# Patient Record
Sex: FEMALE | Race: WHITE | NOT HISPANIC OR LATINO | Employment: OTHER | URBAN - METROPOLITAN AREA
[De-identification: names, ages, dates, MRNs, and addresses within clinical notes are randomized per-mention and may not be internally consistent; named-entity substitution may affect disease eponyms.]

---

## 2017-05-31 ENCOUNTER — APPOINTMENT (OUTPATIENT)
Dept: WOMENS IMAGING | Facility: HOSPITAL | Age: 82
End: 2017-05-31

## 2017-05-31 PROCEDURE — G0202 SCR MAMMO BI INCL CAD: HCPCS | Performed by: RADIOLOGY

## 2017-06-10 ENCOUNTER — HOSPITAL ENCOUNTER (OUTPATIENT)
Facility: HOSPITAL | Age: 82
Setting detail: OBSERVATION
Discharge: HOME | End: 2017-06-13
Attending: EMERGENCY MEDICINE | Admitting: INTERNAL MEDICINE

## 2017-06-10 ENCOUNTER — APPOINTMENT (OUTPATIENT)
Dept: CT IMAGING | Facility: HOSPITAL | Age: 82
End: 2017-06-10

## 2017-06-10 ENCOUNTER — APPOINTMENT (OUTPATIENT)
Dept: GENERAL RADIOLOGY | Facility: HOSPITAL | Age: 82
End: 2017-06-10

## 2017-06-10 DIAGNOSIS — I95.1 ORTHOSTATIC HYPOTENSION: ICD-10-CM

## 2017-06-10 DIAGNOSIS — I31.39 PERICARDIAL EFFUSION: ICD-10-CM

## 2017-06-10 DIAGNOSIS — K62.5 RECTAL BLEEDING: ICD-10-CM

## 2017-06-10 DIAGNOSIS — K52.9 COLITIS: Primary | ICD-10-CM

## 2017-06-10 LAB
ABO GROUP BLD: NORMAL
ALBUMIN SERPL-MCNC: 4.3 G/DL (ref 3.5–5.2)
ALBUMIN/GLOB SERPL: 1.2 G/DL
ALP SERPL-CCNC: 58 U/L (ref 39–117)
ALT SERPL W P-5'-P-CCNC: 12 U/L (ref 1–33)
ANION GAP SERPL CALCULATED.3IONS-SCNC: 16.5 MMOL/L
ANTI-D: NORMAL
APTT PPP: 30.5 SECONDS (ref 22.7–35.4)
AST SERPL-CCNC: 17 U/L (ref 1–32)
BACTERIA UR QL AUTO: ABNORMAL /HPF
BASOPHILS # BLD AUTO: 0.02 10*3/MM3 (ref 0–0.2)
BASOPHILS NFR BLD AUTO: 0.1 % (ref 0–1.5)
BILIRUB SERPL-MCNC: 0.8 MG/DL (ref 0.1–1.2)
BILIRUB UR QL STRIP: NEGATIVE
BLD GP AB SCN SERPL QL: POSITIVE
BUN BLD-MCNC: 21 MG/DL (ref 8–23)
BUN/CREAT SERPL: 21 (ref 7–25)
CALCIUM SPEC-SCNC: 9.6 MG/DL (ref 8.6–10.5)
CHLORIDE SERPL-SCNC: 93 MMOL/L (ref 98–107)
CLARITY UR: CLEAR
CO2 SERPL-SCNC: 25.5 MMOL/L (ref 22–29)
COLOR UR: YELLOW
CREAT BLD-MCNC: 1 MG/DL (ref 0.57–1)
DEPRECATED RDW RBC AUTO: 48.9 FL (ref 37–54)
EOSINOPHIL # BLD AUTO: 0.02 10*3/MM3 (ref 0–0.7)
EOSINOPHIL NFR BLD AUTO: 0.1 % (ref 0.3–6.2)
ERYTHROCYTE [DISTWIDTH] IN BLOOD BY AUTOMATED COUNT: 14.4 % (ref 11.7–13)
GFR SERPL CREATININE-BSD FRML MDRD: 52 ML/MIN/1.73
GLOBULIN UR ELPH-MCNC: 3.5 GM/DL
GLUCOSE BLD-MCNC: 128 MG/DL (ref 65–99)
GLUCOSE UR STRIP-MCNC: NEGATIVE MG/DL
HCT VFR BLD AUTO: 39.6 % (ref 35.6–45.5)
HGB BLD-MCNC: 13.7 G/DL (ref 11.9–15.5)
HGB UR QL STRIP.AUTO: ABNORMAL
HYALINE CASTS UR QL AUTO: ABNORMAL /LPF
IMM GRANULOCYTES # BLD: 0.05 10*3/MM3 (ref 0–0.03)
IMM GRANULOCYTES NFR BLD: 0.3 % (ref 0–0.5)
INR PPP: 0.99 (ref 0.9–1.1)
INR PPP: 1.09 (ref 0.9–1.1)
KETONES UR QL STRIP: ABNORMAL
LEUKOCYTE ESTERASE UR QL STRIP.AUTO: NEGATIVE
LIPASE SERPL-CCNC: 31 U/L (ref 13–60)
LYMPHOCYTES # BLD AUTO: 2.55 10*3/MM3 (ref 0.9–4.8)
LYMPHOCYTES NFR BLD AUTO: 14.4 % (ref 19.6–45.3)
MCH RBC QN AUTO: 32.2 PG (ref 26.9–32)
MCHC RBC AUTO-ENTMCNC: 34.6 G/DL (ref 32.4–36.3)
MCV RBC AUTO: 93.2 FL (ref 80.5–98.2)
MONOCYTES # BLD AUTO: 0.53 10*3/MM3 (ref 0.2–1.2)
MONOCYTES NFR BLD AUTO: 3 % (ref 5–12)
NEUTROPHILS # BLD AUTO: 14.55 10*3/MM3 (ref 1.9–8.1)
NEUTROPHILS NFR BLD AUTO: 82.1 % (ref 42.7–76)
NITRITE UR QL STRIP: NEGATIVE
PH UR STRIP.AUTO: 6 [PH] (ref 5–8)
PLATELET # BLD AUTO: 292 10*3/MM3 (ref 140–500)
PMV BLD AUTO: 11.2 FL (ref 6–12)
POTASSIUM BLD-SCNC: 3.3 MMOL/L (ref 3.5–5.2)
PROT SERPL-MCNC: 7.8 G/DL (ref 6–8.5)
PROT UR QL STRIP: ABNORMAL
PROTHROMBIN TIME: 12.7 SECONDS (ref 11.7–14.2)
PROTHROMBIN TIME: 13.7 SECONDS (ref 11.7–14.2)
RBC # BLD AUTO: 4.25 10*6/MM3 (ref 3.9–5.2)
RBC # UR: ABNORMAL /HPF
REF LAB TEST METHOD: ABNORMAL
RH BLD: NEGATIVE
SODIUM BLD-SCNC: 135 MMOL/L (ref 136–145)
SP GR UR STRIP: >=1.03 (ref 1–1.03)
SQUAMOUS #/AREA URNS HPF: ABNORMAL /HPF
UROBILINOGEN UR QL STRIP: ABNORMAL
WBC NRBC COR # BLD: 17.72 10*3/MM3 (ref 4.5–10.7)
WBC UR QL AUTO: ABNORMAL /HPF

## 2017-06-10 PROCEDURE — 86850 RBC ANTIBODY SCREEN: CPT

## 2017-06-10 PROCEDURE — 85025 COMPLETE CBC W/AUTO DIFF WBC: CPT | Performed by: EMERGENCY MEDICINE

## 2017-06-10 PROCEDURE — G0378 HOSPITAL OBSERVATION PER HR: HCPCS

## 2017-06-10 PROCEDURE — 96360 HYDRATION IV INFUSION INIT: CPT

## 2017-06-10 PROCEDURE — 86922 COMPATIBILITY TEST ANTIGLOB: CPT

## 2017-06-10 PROCEDURE — 71020 HC CHEST PA AND LATERAL: CPT

## 2017-06-10 PROCEDURE — 87046 STOOL CULTR AEROBIC BACT EA: CPT | Performed by: INTERNAL MEDICINE

## 2017-06-10 PROCEDURE — 85610 PROTHROMBIN TIME: CPT | Performed by: INTERNAL MEDICINE

## 2017-06-10 PROCEDURE — 96361 HYDRATE IV INFUSION ADD-ON: CPT

## 2017-06-10 PROCEDURE — 86901 BLOOD TYPING SEROLOGIC RH(D): CPT | Performed by: EMERGENCY MEDICINE

## 2017-06-10 PROCEDURE — 25010000002 LEVOFLOXACIN PER 250 MG: Performed by: EMERGENCY MEDICINE

## 2017-06-10 PROCEDURE — 86870 RBC ANTIBODY IDENTIFICATION: CPT | Performed by: EMERGENCY MEDICINE

## 2017-06-10 PROCEDURE — 85014 HEMATOCRIT: CPT | Performed by: INTERNAL MEDICINE

## 2017-06-10 PROCEDURE — 87045 FECES CULTURE AEROBIC BACT: CPT | Performed by: INTERNAL MEDICINE

## 2017-06-10 PROCEDURE — 93005 ELECTROCARDIOGRAM TRACING: CPT | Performed by: INTERNAL MEDICINE

## 2017-06-10 PROCEDURE — 93010 ELECTROCARDIOGRAM REPORT: CPT | Performed by: INTERNAL MEDICINE

## 2017-06-10 PROCEDURE — 85018 HEMOGLOBIN: CPT | Performed by: INTERNAL MEDICINE

## 2017-06-10 PROCEDURE — 85610 PROTHROMBIN TIME: CPT | Performed by: EMERGENCY MEDICINE

## 2017-06-10 PROCEDURE — 85730 THROMBOPLASTIN TIME PARTIAL: CPT | Performed by: INTERNAL MEDICINE

## 2017-06-10 PROCEDURE — 83690 ASSAY OF LIPASE: CPT | Performed by: EMERGENCY MEDICINE

## 2017-06-10 PROCEDURE — 81001 URINALYSIS AUTO W/SCOPE: CPT | Performed by: INTERNAL MEDICINE

## 2017-06-10 PROCEDURE — 0 IOPAMIDOL 61 % SOLUTION: Performed by: EMERGENCY MEDICINE

## 2017-06-10 PROCEDURE — 86900 BLOOD TYPING SEROLOGIC ABO: CPT | Performed by: EMERGENCY MEDICINE

## 2017-06-10 PROCEDURE — 86920 COMPATIBILITY TEST SPIN: CPT

## 2017-06-10 PROCEDURE — 74177 CT ABD & PELVIS W/CONTRAST: CPT

## 2017-06-10 PROCEDURE — 86850 RBC ANTIBODY SCREEN: CPT | Performed by: EMERGENCY MEDICINE

## 2017-06-10 PROCEDURE — 99284 EMERGENCY DEPT VISIT MOD MDM: CPT

## 2017-06-10 PROCEDURE — 80053 COMPREHEN METABOLIC PANEL: CPT | Performed by: EMERGENCY MEDICINE

## 2017-06-10 RX ORDER — LEVOFLOXACIN 5 MG/ML
500 INJECTION, SOLUTION INTRAVENOUS ONCE
Status: COMPLETED | OUTPATIENT
Start: 2017-06-10 | End: 2017-06-10

## 2017-06-10 RX ORDER — POTASSIUM CHLORIDE 1.5 G/1.77G
40 POWDER, FOR SOLUTION ORAL AS NEEDED
Status: DISCONTINUED | OUTPATIENT
Start: 2017-06-10 | End: 2017-06-13 | Stop reason: HOSPADM

## 2017-06-10 RX ORDER — AMLODIPINE BESYLATE 5 MG/1
5 TABLET ORAL NIGHTLY
Status: DISCONTINUED | OUTPATIENT
Start: 2017-06-10 | End: 2017-06-13 | Stop reason: HOSPADM

## 2017-06-10 RX ORDER — PANTOPRAZOLE SODIUM 40 MG/1
40 TABLET, DELAYED RELEASE ORAL
Status: DISCONTINUED | OUTPATIENT
Start: 2017-06-11 | End: 2017-06-13 | Stop reason: HOSPADM

## 2017-06-10 RX ORDER — AMLODIPINE BESYLATE 5 MG/1
5 TABLET ORAL DAILY
Status: DISCONTINUED | OUTPATIENT
Start: 2017-06-11 | End: 2017-06-10

## 2017-06-10 RX ORDER — VIT A/VIT C/VIT E/ZINC/COPPER 4296-226
1 CAPSULE ORAL DAILY
Status: DISCONTINUED | OUTPATIENT
Start: 2017-06-11 | End: 2017-06-13 | Stop reason: HOSPADM

## 2017-06-10 RX ORDER — LISINOPRIL 40 MG/1
40 TABLET ORAL NIGHTLY
Status: DISCONTINUED | OUTPATIENT
Start: 2017-06-10 | End: 2017-06-13 | Stop reason: HOSPADM

## 2017-06-10 RX ORDER — MELATONIN
1000 DAILY
Status: DISCONTINUED | OUTPATIENT
Start: 2017-06-11 | End: 2017-06-13 | Stop reason: HOSPADM

## 2017-06-10 RX ORDER — POTASSIUM CHLORIDE 750 MG/1
40 CAPSULE, EXTENDED RELEASE ORAL AS NEEDED
Status: DISCONTINUED | OUTPATIENT
Start: 2017-06-10 | End: 2017-06-13 | Stop reason: HOSPADM

## 2017-06-10 RX ORDER — DEXTROSE, SODIUM CHLORIDE, AND POTASSIUM CHLORIDE 5; .45; .15 G/100ML; G/100ML; G/100ML
125 INJECTION INTRAVENOUS CONTINUOUS
Status: DISCONTINUED | OUTPATIENT
Start: 2017-06-10 | End: 2017-06-11

## 2017-06-10 RX ORDER — METOPROLOL TARTRATE 100 MG/1
100 TABLET ORAL DAILY
Status: DISCONTINUED | OUTPATIENT
Start: 2017-06-11 | End: 2017-06-10

## 2017-06-10 RX ORDER — ONDANSETRON 2 MG/ML
4 INJECTION INTRAMUSCULAR; INTRAVENOUS EVERY 6 HOURS PRN
Status: DISCONTINUED | OUTPATIENT
Start: 2017-06-10 | End: 2017-06-13 | Stop reason: HOSPADM

## 2017-06-10 RX ORDER — SODIUM CHLORIDE 0.9 % (FLUSH) 0.9 %
10 SYRINGE (ML) INJECTION AS NEEDED
Status: DISCONTINUED | OUTPATIENT
Start: 2017-06-10 | End: 2017-06-10

## 2017-06-10 RX ORDER — LISINOPRIL 40 MG/1
40 TABLET ORAL DAILY
Status: DISCONTINUED | OUTPATIENT
Start: 2017-06-11 | End: 2017-06-10

## 2017-06-10 RX ORDER — LEVOFLOXACIN 5 MG/ML
500 INJECTION, SOLUTION INTRAVENOUS ONCE
Status: DISCONTINUED | OUTPATIENT
Start: 2017-06-10 | End: 2017-06-10

## 2017-06-10 RX ORDER — METOPROLOL TARTRATE 100 MG/1
100 TABLET ORAL NIGHTLY
Status: DISCONTINUED | OUTPATIENT
Start: 2017-06-10 | End: 2017-06-13 | Stop reason: HOSPADM

## 2017-06-10 RX ORDER — SODIUM CHLORIDE 0.9 % (FLUSH) 0.9 %
1-10 SYRINGE (ML) INJECTION AS NEEDED
Status: DISCONTINUED | OUTPATIENT
Start: 2017-06-10 | End: 2017-06-13 | Stop reason: HOSPADM

## 2017-06-10 RX ADMIN — METOPROLOL TARTRATE 100 MG: 100 TABLET ORAL at 20:59

## 2017-06-10 RX ADMIN — AMLODIPINE BESYLATE 5 MG: 5 TABLET ORAL at 20:59

## 2017-06-10 RX ADMIN — POTASSIUM CHLORIDE, DEXTROSE MONOHYDRATE AND SODIUM CHLORIDE 125 ML/HR: 150; 5; 450 INJECTION, SOLUTION INTRAVENOUS at 18:35

## 2017-06-10 RX ADMIN — SODIUM CHLORIDE 1000 ML: 9 INJECTION, SOLUTION INTRAVENOUS at 09:41

## 2017-06-10 RX ADMIN — IOPAMIDOL 85 ML: 612 INJECTION, SOLUTION INTRAVENOUS at 10:41

## 2017-06-10 RX ADMIN — LEVOFLOXACIN 500 MG: 5 INJECTION, SOLUTION INTRAVENOUS at 11:53

## 2017-06-10 RX ADMIN — POTASSIUM CHLORIDE, DEXTROSE MONOHYDRATE AND SODIUM CHLORIDE 125 ML/HR: 150; 5; 450 INJECTION, SOLUTION INTRAVENOUS at 18:37

## 2017-06-10 RX ADMIN — LISINOPRIL 40 MG: 40 TABLET ORAL at 20:59

## 2017-06-10 RX ADMIN — METRONIDAZOLE 500 MG: 500 INJECTION, SOLUTION INTRAVENOUS at 16:23

## 2017-06-10 RX ADMIN — CALCIUM CARBONATE-VITAMIN D TAB 500 MG-200 UNIT 500 MG: 500-200 TAB at 18:35

## 2017-06-10 NOTE — PLAN OF CARE
Problem: Patient Care Overview (Adult)  Goal: Plan of Care Review  Outcome: Ongoing (interventions implemented as appropriate)    06/10/17 1339   Coping/Psychosocial Response Interventions   Plan Of Care Reviewed With patient   Patient Care Overview   Progress no change   Outcome Evaluation   Outcome Summary/Follow up Plan new admission. bloody stools at home, usually independent, new orthostatic hypotn. oriented to room and unit, pt educated to call for asst.       Goal: Adult Individualization and Mutuality  Outcome: Ongoing (interventions implemented as appropriate)  Goal: Discharge Needs Assessment  Outcome: Ongoing (interventions implemented as appropriate)    Problem: Diarrhea (Adult)  Goal: Identify Related Risk Factors and Signs and Symptoms  Outcome: Outcome(s) achieved Date Met:  06/10/17  Goal: Improved/Reduced Symptoms  Outcome: Ongoing (interventions implemented as appropriate)

## 2017-06-10 NOTE — H&P
CHIEF COMPLAINT:  Lower abdominal cramps; blood per rectum.     HISTORY OF PRESENT ILLNESS:  Patient is a very nice 89-year-old lady. She has a history of hypertension, macular degeneration, and osteoporosis. The patient states she was doing well, until yesterday after lunch. She ate a tuna melt at a restaurant. Her sister ate one-half of her sandwich. Driving home, the patient started feeling bad; she started having some nausea and lower abdominal cramps. By the time she got home, the discomfort was significant, she vomited x2, and had a few loose bowel movements. Thereafter for the rest of the day, she had lower abdominal crampy pain. She had nausea but no more vomiting. She started to develop diarrhea; at first her stools were light in color, but by 3 or 4 in the morning she was having bright-red blood per rectum. She states she had a significant amount of blood per rectum. She still had some at times severe crampy abdominal discomfort. She states she felt hot and dizzy at the onset of these symptoms (after eating the tuna melt). She had no other fevers, chills, or sweats. She had a little bit of dizziness this morning. After lunch yesterday, she drank a little bit of fluid and took her medicines, but otherwise did not eat or drink anything. The patient has not had any significant travel. She came back from [place], [place], this spring. She has not been around anybody that has been ill. She has had no past GI problems, although she states she has constipation from time-to-time and was told she had diverticula on a distant colonoscopy.     ALLERGIES:  No known drug allergies.      CURRENT MEDICATIONS:  1.  Amlodipine 5 mg a day.  2.  Lisinopril 40 mg daily.   3.  Toprol- mg a day.   4.  Vitamin D 2000 international units daily.   5.  Diazepam 10 mg at night as needed; she takes this maybe a couple times a month. 6  6.  PreserVision b.i.d.   7.  Calcium plus D b.i.d.     PAST MEDICAL HISTORY:  1.  Essential  hypertension.   2.  Possible primary hyperparathyroidism; this was based on a nodule being detected in her parathyroid gland. I am not sure if her PTH or calciums were ever elevated.   3.  Vitamin D deficiency.  4.  Chronic anemia.  5.  Osteoporosis.   6.  Macular degeneration.   7.  The patient has had polymyalgia rheumatica several years ago.   8.  She has a history of restless leg syndrome.   9.  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   10.  Patient is up-to-date on influenza and Pneumovax. She has not had Tdap, Zostavax, and I am not sure if she had Prevnar 13. She does not have a gastroenterologist.     PAST SURGICAL HISTORY:  1.  Appendectomy age 11.   2.  Patient's last colonoscopy was in ; apparently demonstrated some diverticula.     FAMILY HISTORY:  Father's history is unknown to the patient; he is obviously . Mother  at age 80; she had bladder cancer and asthma. The patient has an 87-year-old sister who had Mycobacterium avium intracellulare infection; she has osteoporosis and hypertension. She has a 63-year-old daughter who has had breast cancer and has colon polyps. She has 2 grandchildren in good health.     SOCIAL HISTORY:  The patient quit smoking 25 years ago; she had a 30 pack-year history of abuse. She drinks usually 2 mixed drinks or a glass of wine 3 or 4 days a week. She is ; she lives with her . She exercises at [facility], doing low-impact aerobics. She also enjoys playing bridge.     REVIEW OF SYSTEMS:  GENERAL: No fevers, chills, or night sweats, other than what is mentioned above. The patient's weight has been stable. She has been having fatigue.   NERVOUS SYSTEM: Unremarkable.   GASTROENTEROLOGIC: As mentioned above.   CARDIORESPIRATORY: No chest pain or shortness breath. No leg edema.   GENITOURINARY: Unremarkable.   MUSCULOSKELETAL: Unremarkable.   DERMATOLOGIC: Unremarkable.   PSYCHIATRIC: No  depression or anxiety.     PHYSICAL EXAMINATION:  VITAL SIGNS: Blood pressure 165/75, pulse 63, respiratory rate 20, temperature 98.5.   APPEARANCE: The patient is an elderly, thin,  lady who is in no acute distress. She is sitting up in bed, talking.   HEENT: Normocephalic, atraumatic. Pupils round, equal, and reactive to light and accommodation. Bilateral arcus. No sinus tenderness. Ears normal externally. Pharynx clear. Mucous membranes are dry.   NECK: Slightly decreased range of motion. No lymphadenopathy, JVD, or thyromegaly.   LUNGS: Are clear to auscultation and percussion.   HEART: Regular rate and rhythm. No murmur, gallop, or rub.   PERIPHERAL VASCULAR: Good dorsal, pedal, and posterior tibial pulses.   ABDOMEN: No significant fat pad. Appendectomy scar right lower quadrant. The patient has normal bowel sounds. She has mild tenderness palpating her pelvic region, but no rebound or guarding.   EXTREMITIES: Without clubbing, cyanosis, edema. She has a few varicosities below the knees.   JOINTS: OA changes hands and feet.   DERMATOLOGIC: Unremarkable.   NEUROLOGIC: Is nonfocal.     DIAGNOSTIC DATA:  CMP was significant for glucose 128, sodium 135, potassium 3.3, creatinine 1.0, BUN 21, estimated GFR 52. LFTs were normal. Lipase 31. INR was 0.99. White count 17, hemoglobin 13, hematocrit 39, platelet count was 269,000, MCV was 93. A CT of the abdomen and pelvis demonstrated evidence of nonspecific colitis distal colon; no obstructive uropathy; multiple very small hepatic cysts; mild to moderate pericardial effusion; very small pulmonary nodules. Three month followup was suggested for this.    IMPRESSION:  1.  Lower GI hemorrhage: Possible etiologies colitis, diverticular disease, ischemia.   2.  History of essential hypertension.   3.  Osteoporosis.   4.  Macular degeneration.   5.  Multiple very small pulmonary nodules, etiology unknown.     PLAN:  The patient has been admitted to the hospital. She  has been placed on clear liquids. I have started IV fluids. I am replacing her potassium. I have started on both Levaquin and Flagyl. We are obtaining stool studies. We will watch her hemoglobin and hematocrit closely. She has been typed-and-screened for a couple units. She will be seen by Gastroenterology. It should be noted this patient is NO CODE.        BIMAL Carlson:ms  D:   06/10/2017 16:41:28  T:   06/10/2017 17:22:12  Job ID:   [id number]  Document ID:   [id number]  cc:

## 2017-06-10 NOTE — ED NOTES
Orthostatic Vitals    Lying BP       162/80         HR      57        @ 0937  Standing X1 BP     85/42       HR     73        @ 0942  Standing (modified to sitting) X2 BP    160/84        HR      62       @ 0944     Cristina Robertson  06/10/17 0944       Cristina Robertson  06/10/17 0947

## 2017-06-10 NOTE — ED PROVIDER NOTES
EMERGENCY DEPARTMENT ENCOUNTER    CHIEF COMPLAINT  Chief Complaint: rectal bleeding  History given by: patient, family  History limited by: nothing  Room Number: 28/28  PMD: Harley Barney MD      HPI:  Pt is a 89 y.o. female who presents complaining of rectal bleeding, which began last night. Pt state that she developed generalized abd pain and N/V/D yesterday after eating lunch. Pt states that she then began to pass bright red blood per rectum during the night. Pt states that she continues to have mild abd cramping, which improved after having a BM. Pt also complains of chronic, unchanged light headedness and generalized weakness but denies fever, chills, SOB. Pt states that she does have a history of diverticulosis but denies recent abx use.    Duration:  One day  Onset: gradual  Timing: intermittent  Location: rectal  Radiation: N/A  Quality: bright red blood  Intensity/Severity: moderate  Progression: unchanged  Associated Symptoms: abd pain, N/V/D, chronic light headedness, generalized weakness  Aggravating Factors: none  Alleviating Factors: none  Previous Episodes: Pt denies having similar symptoms previously.  Treatment before arrival: none    PAST MEDICAL HISTORY  Active Ambulatory Problems     Diagnosis Date Noted   • Osteoporosis 05/03/2016     Resolved Ambulatory Problems     Diagnosis Date Noted   • No Resolved Ambulatory Problems     Past Medical History:   Diagnosis Date   • Anemia    • Diverticulosis    • History of irregular heartbeat    • Hypertension    • Macular degeneration    • Osteoporosis    • Osteoporosis    • PNA (pneumonia)    • Urinary tract infection        PAST SURGICAL HISTORY  Past Surgical History:   Procedure Laterality Date   • APPENDECTOMY     • SKIN CANCER EXCISION         FAMILY HISTORY  History reviewed. No pertinent family history.    SOCIAL HISTORY  Social History     Social History   • Marital status: Single     Spouse name: N/A   • Number of children: N/A   • Years of  education: N/A     Occupational History   • Not on file.     Social History Main Topics   • Smoking status: Former Smoker   • Smokeless tobacco: Not on file      Comment: quit 25 years ago   • Alcohol use 1.2 oz/week     2 Glasses of wine per week      Comment: social   • Drug use: No   • Sexual activity: Defer     Other Topics Concern   • Not on file     Social History Narrative   • No narrative on file       ALLERGIES  Review of patient's allergies indicates no known allergies.    REVIEW OF SYSTEMS  Review of Systems   Constitutional: Negative for fever.   HENT: Negative for sore throat.    Eyes: Negative.    Respiratory: Negative for cough and shortness of breath.    Cardiovascular: Negative for chest pain.   Gastrointestinal: Positive for abdominal pain, anal bleeding, diarrhea, nausea and vomiting.   Genitourinary: Negative for dysuria.   Musculoskeletal: Negative for neck pain.   Skin: Negative for rash.   Allergic/Immunologic: Negative.    Neurological: Positive for weakness (generalized) and light-headedness (chronic, unchanged). Negative for numbness and headaches.   Hematological: Negative.    Psychiatric/Behavioral: Negative.    All other systems reviewed and are negative.      PHYSICAL EXAM  ED Triage Vitals   Temp Heart Rate Resp BP SpO2   06/10/17 0904 06/10/17 0904 06/10/17 0904 06/10/17 0907 06/10/17 0904   99.2 °F (37.3 °C) 72 16 149/87 97 %      Temp src Heart Rate Source Patient Position BP Location FiO2 (%)   06/10/17 0904 -- 06/10/17 0907 06/10/17 0907 --   Tympanic  Sitting Left arm        Physical Exam   Constitutional: She is oriented to person, place, and time. She appears distressed (mild).   HENT:   Head: Normocephalic and atraumatic.   Eyes: Conjunctivae and EOM are normal. Pupils are equal, round, and reactive to light.   Neck: Normal range of motion. Neck supple.   Cardiovascular: Normal rate, regular rhythm and normal heart sounds.    No murmur heard.  Pulmonary/Chest: Effort normal  and breath sounds normal. No respiratory distress.   Abdominal: Soft. Bowel sounds are normal. There is no tenderness. There is no rebound and no guarding.   Genitourinary: Rectal exam shows guaiac positive stool.   Genitourinary Comments: Female chaperone present for  exam. Bright red blood with brown stool in the rectal vault.   Musculoskeletal: Normal range of motion. She exhibits no edema.   Left foot is in a walking boot   Neurological: She is alert and oriented to person, place, and time. She has normal sensation and normal strength.   Skin: Skin is warm and dry. No rash noted.   Psychiatric: Mood and affect normal.   Nursing note and vitals reviewed.      LAB RESULTS  Lab Results (last 24 hours)     Procedure Component Value Units Date/Time    CBC & Differential [[id number]] Collected:  06/10/17 0938    Specimen:  Blood Updated:  06/10/17 1015    Narrative:       The following orders were created for panel order CBC & Differential.  Procedure                               Abnormality         Status                     ---------                               -----------         ------                     Scan Slide[[id number]]                                                                  CBC Auto Differential[[id number]]         Abnormal            Final result                 Please view results for these tests on the individual orders.    Comprehensive Metabolic Panel [[id number]]  (Abnormal) Collected:  06/10/17 0938    Specimen:  Blood Updated:  06/10/17 1008     Glucose 128 (H) mg/dL      BUN 21 mg/dL      Creatinine 1.00 mg/dL      Sodium 135 (L) mmol/L      Potassium 3.3 (L) mmol/L      Chloride 93 (L) mmol/L      CO2 25.5 mmol/L      Calcium 9.6 mg/dL      Total Protein 7.8 g/dL      Albumin 4.30 g/dL      ALT (SGPT) 12 U/L      AST (SGOT) 17 U/L      Alkaline Phosphatase 58 U/L      Total Bilirubin 0.8 mg/dL      eGFR Non African Amer 52 (L) mL/min/1.73      Globulin 3.5 gm/dL      A/G Ratio 1.2 g/dL       BUN/Creatinine Ratio 21.0     Anion Gap 16.5 mmol/L     Narrative:       The MDRD GFR formula is only valid for adults with stable renal function between ages 18 and 70.    Lipase [[id number]]  (Normal) Collected:  06/10/17 0938    Specimen:  Blood Updated:  06/10/17 1008     Lipase 31 U/L     Protime-INR [[id number]]  (Normal) Collected:  06/10/17 0938    Specimen:  Blood Updated:  06/10/17 1003     Protime 12.7 Seconds      INR 0.99    CBC Auto Differential [[id number]]  (Abnormal) Collected:  06/10/17 0938    Specimen:  Blood Updated:  06/10/17 1015     WBC 17.72 (H) 10*3/mm3      RBC 4.25 10*6/mm3      Hemoglobin 13.7 g/dL      Hematocrit 39.6 %      MCV 93.2 fL      MCH 32.2 (H) pg      MCHC 34.6 g/dL      RDW 14.4 (H) %      RDW-SD 48.9 fl      MPV 11.2 fL      Platelets 292 10*3/mm3      Neutrophil % 82.1 (H) %      Lymphocyte % 14.4 (L) %      Monocyte % 3.0 (L) %      Eosinophil % 0.1 (L) %      Basophil % 0.1 %      Immature Grans % 0.3 %      Neutrophils, Absolute 14.55 (H) 10*3/mm3      Lymphocytes, Absolute 2.55 10*3/mm3      Monocytes, Absolute 0.53 10*3/mm3      Eosinophils, Absolute 0.02 10*3/mm3      Basophils, Absolute 0.02 10*3/mm3      Immature Grans, Absolute 0.05 (H) 10*3/mm3           I ordered the above labs and reviewed the results    RADIOLOGY  CT Abdomen Pelvis With Contrast   Final Result       1. Evidence of nonspecific colitis of the distal colon. Mild free fluid   in the pelvis.  Colonic diverticulosis.   2. No obstructive uropathy. Multiple small hepatic cysts and low   densities too small to characterize, small right renal low density too   small to characterize.   3. Mild to moderate pericardial effusion, only partly included.   4. Multiple small pulmonary nodules, three-month follow-up chest CT is   advised to characterize change.       Discussed by telephone with Dr. Landers at time of interpretation, 1127 on   06/10/2017.       This report was finalized on 6/10/2017 11:30 AM by   Patricio Stevenson MD.             1128- Reviewed pt's CT Abd/Pelvis, which shows distal colitis, no diverticulitis and a small pericardial effusion. Independently viewed by me. Interpreted by radiologist. Discussed with Dr. Stevenson.    I ordered the above noted radiological studies. Interpreted by radiologist. Discussed with radiologist (Dr. Stevenson). Reviewed by me in PACS.       PROCEDURES  Procedures      PROGRESS AND CONSULTS  ED Course     0928- Ordered blood work, lipase, PT with INR, orthostatic BPs and CT Abd/Pelvis for further evaluation. Ordered IVF for hydration.    1008- Per ER tech, the pt is orthostatic.     1139- Ordered levaquin and flagyl for colitis.     11:40 AM  Latest vital signs   BP- 174/83 HR- 63 Temp- 99.2 °F (37.3 °C) (Tympanic) O2 sat- 98%    1150- Rechecked pt. Pt is resting comfortably. Notified pt and family of the pt's lab and imaging results, including the pt's elevated WBC count, normal hemoglobin and colitis seen on CT Abd/Pelvis. Discussed the plan to admit the pt for IV abx and further evaluation. Pt and family agree with the plan and all questions were addressed.    1153- Placed call to Dr. Barney (PMD) for admission.    1156- Discussed the pt's case with Dr. Barney (PMD) who agrees to admit the pt to a telemetry bed.    MEDICAL DECISION MAKING  Results were reviewed/discussed with the patient and they were also made aware of online access. Pt also made aware that some labs, such as cultures, will not be resulted during ER visit and follow up with PMD is necessary.     MDM  Number of Diagnoses or Management Options  Colitis:   Orthostatic hypotension:   Pericardial effusion, small:   Rectal bleeding:      Amount and/or Complexity of Data Reviewed  Clinical lab tests: ordered and reviewed (WBC=17.72)  Tests in the radiology section of CPT®: ordered and reviewed (CT Abd/Pelvis shows distal colitis, no diverticulitis and a small pericardial effusion.)  Discussion of test results  with the performing providers: yes (D/w Dr. Stevenson)  Obtain history from someone other than the patient: yes (spouse)  Discuss the patient with other providers: yes (D/w Dr. Barney (PMD))  Independent visualization of images, tracings, or specimens: yes           DIAGNOSIS  Final diagnoses:   Colitis   Rectal bleeding   Orthostatic hypotension   Pericardial effusion, small       DISPOSITION  ADMISSION    Discussed treatment plan and reason for admission with pt/family and admitting physician.  Pt/family voiced understanding of the plan for admission for further testing/treatment as needed.     Latest Documented Vital Signs:  As of 12:03 PM  BP- 174/83 HR- 63 Temp- 99.2 °F (37.3 °C) (Tympanic) O2 sat- 98%    --  Documentation assistance provided by salud Santiago for Dr. Landers.  Information recorded by the scribe was done at my direction and has been verified and validated by me.     Lexy Santiago  06/10/17 6324       Marko Landers MD  06/10/17 4536

## 2017-06-11 LAB
ANION GAP SERPL CALCULATED.3IONS-SCNC: 11.8 MMOL/L
BASOPHILS # BLD AUTO: 0.02 10*3/MM3 (ref 0–0.2)
BASOPHILS NFR BLD AUTO: 0.1 % (ref 0–1.5)
BUN BLD-MCNC: 11 MG/DL (ref 8–23)
BUN/CREAT SERPL: 18.6 (ref 7–25)
CALCIUM SPEC-SCNC: 8.3 MG/DL (ref 8.6–10.5)
CHLORIDE SERPL-SCNC: 99 MMOL/L (ref 98–107)
CO2 SERPL-SCNC: 24.2 MMOL/L (ref 22–29)
CREAT BLD-MCNC: 0.59 MG/DL (ref 0.57–1)
DEPRECATED RDW RBC AUTO: 50.1 FL (ref 37–54)
EOSINOPHIL # BLD AUTO: 0.06 10*3/MM3 (ref 0–0.7)
EOSINOPHIL NFR BLD AUTO: 0.4 % (ref 0.3–6.2)
ERYTHROCYTE [DISTWIDTH] IN BLOOD BY AUTOMATED COUNT: 14.7 % (ref 11.7–13)
ERYTHROCYTE [SEDIMENTATION RATE] IN BLOOD: 20 MM/HR (ref 0–30)
GFR SERPL CREATININE-BSD FRML MDRD: 96 ML/MIN/1.73
GLUCOSE BLD-MCNC: 133 MG/DL (ref 65–99)
HCT VFR BLD AUTO: 33.3 % (ref 35.6–45.5)
HCT VFR BLD AUTO: 33.8 % (ref 35.6–45.5)
HCT VFR BLD AUTO: 34 % (ref 35.6–45.5)
HCT VFR BLD AUTO: 34.7 % (ref 35.6–45.5)
HGB BLD-MCNC: 11.4 G/DL (ref 11.9–15.5)
HGB BLD-MCNC: 11.4 G/DL (ref 11.9–15.5)
HGB BLD-MCNC: 11.5 G/DL (ref 11.9–15.5)
HGB BLD-MCNC: 12.1 G/DL (ref 11.9–15.5)
IMM GRANULOCYTES # BLD: 0.05 10*3/MM3 (ref 0–0.03)
IMM GRANULOCYTES NFR BLD: 0.3 % (ref 0–0.5)
LYMPHOCYTES # BLD AUTO: 1.64 10*3/MM3 (ref 0.9–4.8)
LYMPHOCYTES NFR BLD AUTO: 10.2 % (ref 19.6–45.3)
MAGNESIUM SERPL-MCNC: 1.6 MG/DL (ref 1.6–2.4)
MCH RBC QN AUTO: 32.3 PG (ref 26.9–32)
MCHC RBC AUTO-ENTMCNC: 34.2 G/DL (ref 32.4–36.3)
MCV RBC AUTO: 94.3 FL (ref 80.5–98.2)
MONOCYTES # BLD AUTO: 1.69 10*3/MM3 (ref 0.2–1.2)
MONOCYTES NFR BLD AUTO: 10.5 % (ref 5–12)
NEUTROPHILS # BLD AUTO: 12.58 10*3/MM3 (ref 1.9–8.1)
NEUTROPHILS NFR BLD AUTO: 78.5 % (ref 42.7–76)
PLATELET # BLD AUTO: 238 10*3/MM3 (ref 140–500)
PMV BLD AUTO: 11 FL (ref 6–12)
POTASSIUM BLD-SCNC: 3.4 MMOL/L (ref 3.5–5.2)
RBC # BLD AUTO: 3.53 10*6/MM3 (ref 3.9–5.2)
SODIUM BLD-SCNC: 135 MMOL/L (ref 136–145)
TSH SERPL DL<=0.05 MIU/L-ACNC: 1.59 MIU/ML (ref 0.27–4.2)
WBC NRBC COR # BLD: 16.04 10*3/MM3 (ref 4.5–10.7)

## 2017-06-11 PROCEDURE — 85652 RBC SED RATE AUTOMATED: CPT | Performed by: INTERNAL MEDICINE

## 2017-06-11 PROCEDURE — G0378 HOSPITAL OBSERVATION PER HR: HCPCS

## 2017-06-11 PROCEDURE — 96361 HYDRATE IV INFUSION ADD-ON: CPT

## 2017-06-11 PROCEDURE — 83735 ASSAY OF MAGNESIUM: CPT | Performed by: INTERNAL MEDICINE

## 2017-06-11 PROCEDURE — 86900 BLOOD TYPING SEROLOGIC ABO: CPT

## 2017-06-11 PROCEDURE — 86901 BLOOD TYPING SEROLOGIC RH(D): CPT

## 2017-06-11 PROCEDURE — 80048 BASIC METABOLIC PNL TOTAL CA: CPT | Performed by: INTERNAL MEDICINE

## 2017-06-11 PROCEDURE — 85014 HEMATOCRIT: CPT | Performed by: INTERNAL MEDICINE

## 2017-06-11 PROCEDURE — 85025 COMPLETE CBC W/AUTO DIFF WBC: CPT | Performed by: INTERNAL MEDICINE

## 2017-06-11 PROCEDURE — 99204 OFFICE O/P NEW MOD 45 MIN: CPT | Performed by: INTERNAL MEDICINE

## 2017-06-11 PROCEDURE — 84443 ASSAY THYROID STIM HORMONE: CPT | Performed by: INTERNAL MEDICINE

## 2017-06-11 PROCEDURE — 85018 HEMOGLOBIN: CPT | Performed by: INTERNAL MEDICINE

## 2017-06-11 RX ORDER — DEXTROSE AND SODIUM CHLORIDE 5; .45 G/100ML; G/100ML
75 INJECTION, SOLUTION INTRAVENOUS CONTINUOUS
Status: DISCONTINUED | OUTPATIENT
Start: 2017-06-11 | End: 2017-06-12

## 2017-06-11 RX ADMIN — POTASSIUM CHLORIDE, DEXTROSE MONOHYDRATE AND SODIUM CHLORIDE 125 ML/HR: 150; 5; 450 INJECTION, SOLUTION INTRAVENOUS at 02:40

## 2017-06-11 RX ADMIN — PANTOPRAZOLE SODIUM 40 MG: 40 TABLET, DELAYED RELEASE ORAL at 05:50

## 2017-06-11 RX ADMIN — Medication 1 TABLET: at 09:36

## 2017-06-11 RX ADMIN — CALCIUM CARBONATE-VITAMIN D TAB 500 MG-200 UNIT 500 MG: 500-200 TAB at 08:11

## 2017-06-11 RX ADMIN — DEXTROSE AND SODIUM CHLORIDE 75 ML/HR: 5; 450 INJECTION, SOLUTION INTRAVENOUS at 11:10

## 2017-06-11 RX ADMIN — LISINOPRIL 40 MG: 40 TABLET ORAL at 20:35

## 2017-06-11 RX ADMIN — AMLODIPINE BESYLATE 5 MG: 5 TABLET ORAL at 20:34

## 2017-06-11 RX ADMIN — METOPROLOL TARTRATE 100 MG: 100 TABLET ORAL at 20:35

## 2017-06-11 RX ADMIN — VITAMIN D, TAB 1000IU (100/BT) 1000 UNITS: 25 TAB at 08:11

## 2017-06-11 RX ADMIN — POTASSIUM CHLORIDE 40 MEQ: 750 CAPSULE, EXTENDED RELEASE ORAL at 17:19

## 2017-06-11 RX ADMIN — CALCIUM CARBONATE-VITAMIN D TAB 500 MG-200 UNIT 500 MG: 500-200 TAB at 17:19

## 2017-06-11 NOTE — PROGRESS NOTES
"History:    This is an 89-year-old lady who was admitted yesterday with bloody diarrhea.  She had an elevated white blood cell count and her CT of the abdomen and pelvis was consistent with colitis.    The patient's been on clear liquids.  She's receiving both Levaquin and Flagyl.  She's had no further bowel movements.  No fever or chills.  No significant abdominal discomfort.    Allergies  Review of patient's allergies indicates no known allergies.      Current Facility-Administered Medications:   •  amLODIPine (NORVASC) tablet 5 mg, 5 mg, Oral, Nightly, Harley Barney MD, 5 mg at 06/10/17 2059  •  calcium-vitamin D 500-200 MG-UNIT tablet 500 mg, 500 mg, Oral, BID, Harley Barney MD, 500 mg at 06/11/17 0811  •  cholecalciferol (VITAMIN D3) tablet 1,000 Units, 1,000 Units, Oral, Daily, Harley Barney MD, 1,000 Units at 06/11/17 0811  •  dextrose 5 % and sodium chloride 0.45 % with KCl 20 mEq/L infusion, 125 mL/hr, Intravenous, Continuous, Harley Barney MD, Last Rate: 125 mL/hr at 06/11/17 0240, 125 mL/hr at 06/11/17 0240  •  lisinopril (PRINIVIL,ZESTRIL) tablet 40 mg, 40 mg, Oral, Nightly, Harley Barney MD, 40 mg at 06/10/17 2059  •  metoprolol tartrate (LOPRESSOR) tablet 100 mg, 100 mg, Oral, Nightly, Harley Barney MD, 100 mg at 06/10/17 2059  •  ondansetron (ZOFRAN) injection 4 mg, 4 mg, Intravenous, Q6H PRN, Harley Barney MD  •  pantoprazole (PROTONIX) EC tablet 40 mg, 40 mg, Oral, Q AM, Harley Barney MD, 40 mg at 06/11/17 0550  •  potassium chloride (KLOR-CON) packet 40 mEq, 40 mEq, Oral, PRN, Harley Barney MD  •  potassium chloride (MICRO-K) CR capsule 40 mEq, 40 mEq, Oral, PRN, Harley Barney MD  •  PRESERVISION AREDS capsule 1 tablet, 1 tablet, Oral, Daily, Harley Barney MD, 1 tablet at 06/11/17 0936  •  sodium chloride 0.9 % flush 1-10 mL, 1-10 mL, Intravenous, PRN, Harley Barney MD    /76 (BP Location: Right arm, Patient Position: Sitting)  Pulse 65  Temp 97.8 °F (36.6 °C) (Oral)   Resp 20  Ht 64\" " (162.6 cm)  Wt 119 lb 1.6 oz (54 kg)  SpO2 97%  BMI 20.44 kg/m2    Physical Exam    Appearance: Elderly thin  lady.  She is in no distress.    HEENT: Normocephalic, atraumatic.  Pupils round and equal.  Pharynx clear and mucous membranes moist.    Lungs: Clear to auscultation percussion    Heart: Regular rate and rhythm    Abdomen: Bowel sounds were normal.  The patient had no rebound or guarding.  She had minimal lower abdominal tenderness.    Extremities: No clubbing, cyanosis or edema    Lab Results (last 24 hours)     Procedure Component Value Units Date/Time    Protime-INR [[id number]]  (Normal) Collected:  06/10/17 1705    Specimen:  Blood Updated:  06/10/17 1802     Protime 13.7 Seconds      INR 1.09    aPTT [[id number]]  (Normal) Collected:  06/10/17 1705    Specimen:  Blood Updated:  06/10/17 1802     PTT 30.5 seconds     Urinalysis With / Culture If Indicated [[id number]]  (Abnormal) Collected:  06/10/17 2228    Specimen:  Urine from Urine, Clean Catch Updated:  06/10/17 2246     Color, UA Yellow     Appearance, UA Clear     pH, UA 6.0     Specific Gravity, UA >=1.030     Glucose, UA Negative     Ketones, UA Trace (A)     Bilirubin, UA Negative     Blood, UA Trace (A)     Protein, UA Trace (A)     Leuk Esterase, UA Negative     Nitrite, UA Negative     Urobilinogen, UA 0.2 E.U./dL    Urinalysis, Microscopic Only [[id number]]  (Abnormal) Collected:  06/10/17 2228    Specimen:  Urine from Urine, Clean Catch Updated:  06/10/17 2246     RBC, UA 0-2 /HPF      WBC, UA 3-5 (A) /HPF      Bacteria, UA None Seen /HPF      Squamous Epithelial Cells, UA 0-2 /HPF      Hyaline Casts, UA 0-2 /LPF      Methodology Automated Microscopy    Hemoglobin & Hematocrit, Blood [[id number]]  (Abnormal) Collected:  06/10/17 2355    Specimen:  Blood Updated:  06/11/17 0040     Hemoglobin 11.4 (L) g/dL      Hematocrit 33.8 (L) %     CBC Auto Differential [[id number]]  (Abnormal) Collected:  06/11/17 0529    Specimen:  Blood  Updated:  06/11/17 0621     WBC 16.04 (H) 10*3/mm3      RBC 3.53 (L) 10*6/mm3      Hemoglobin 11.4 (L) g/dL      Hematocrit 33.3 (L) %      MCV 94.3 fL      MCH 32.3 (H) pg      MCHC 34.2 g/dL      RDW 14.7 (H) %      RDW-SD 50.1 fl      MPV 11.0 fL      Platelets 238 10*3/mm3      Neutrophil % 78.5 (H) %      Lymphocyte % 10.2 (L) %      Monocyte % 10.5 %      Eosinophil % 0.4 %      Basophil % 0.1 %      Immature Grans % 0.3 %      Neutrophils, Absolute 12.58 (H) 10*3/mm3      Lymphocytes, Absolute 1.64 10*3/mm3      Monocytes, Absolute 1.69 (H) 10*3/mm3      Eosinophils, Absolute 0.06 10*3/mm3      Basophils, Absolute 0.02 10*3/mm3      Immature Grans, Absolute 0.05 (H) 10*3/mm3     Magnesium [[id number]]  (Normal) Collected:  06/11/17 0529    Specimen:  Blood Updated:  06/11/17 0644     Magnesium 1.6 mg/dL     Basic Metabolic Panel [[id number]]  (Abnormal) Collected:  06/11/17 0529    Specimen:  Blood Updated:  06/11/17 0644     Glucose 133 (H) mg/dL      BUN 11 mg/dL      Creatinine 0.59 mg/dL      Sodium 135 (L) mmol/L      Potassium 3.4 (L) mmol/L      Chloride 99 mmol/L      CO2 24.2 mmol/L      Calcium 8.3 (L) mg/dL      eGFR Non African Amer 96 mL/min/1.73      BUN/Creatinine Ratio 18.6     Anion Gap 11.8 mmol/L     Narrative:       The MDRD GFR formula is only valid for adults with stable renal function between ages 18 and 70.    TSH [[id number]]  (Normal) Collected:  06/11/17 0529    Specimen:  Blood Updated:  06/11/17 0654     TSH 1.590 mIU/mL     Hemoglobin & Hematocrit, Blood [[id number]]  (Abnormal) Collected:  06/11/17 0756    Specimen:  Blood Updated:  06/11/17 0835     Hemoglobin 12.1 g/dL      Hematocrit 34.7 (L) %     Stool Culture [[id number]] Collected:  06/10/17 7543    Specimen:  Stool from Per Rectum Updated:  06/11/17 1044     Stool Culture Culture in progress        Chest x-ray PA and lateral: Consistent with COPD.    EKG: Sinus rhythm with left ventricular hypertrophy    Imp:    1.  Lower GI  hemorrhage: Possible etiologies colitis, diverticular disease, ischemia.  Patient has no further bleeding.  She has little abdominal discomfort.  She still does have leukocytosis.    2.  History of essential hypertension.     3.  Osteoporosis.     4.  Macular degeneration.          Plan:    The patient is to be seen by gastroenterology today.    I will decrease her IV fluids.  I will keep on clear liquids for now.  I will continue the antibiotic.    We'll check some lab in the morning.    Harley Barney MD  6/11/2017  10:49 AM

## 2017-06-11 NOTE — PLAN OF CARE
Problem: Patient Care Overview (Adult)  Goal: Plan of Care Review  Outcome: Ongoing (interventions implemented as appropriate)  Goal: Adult Individualization and Mutuality  Outcome: Ongoing (interventions implemented as appropriate)  Goal: Discharge Needs Assessment  Outcome: Ongoing (interventions implemented as appropriate)    Problem: Diarrhea (Adult)  Goal: Identify Related Risk Factors and Signs and Symptoms  Outcome: Outcome(s) achieved Date Met:  06/11/17  Goal: Improved/Reduced Symptoms  Outcome: Ongoing (interventions implemented as appropriate)

## 2017-06-11 NOTE — PLAN OF CARE
Problem: Patient Care Overview (Adult)  Goal: Plan of Care Review  Outcome: Ongoing (interventions implemented as appropriate)    06/11/17 0532   Coping/Psychosocial Response Interventions   Plan Of Care Reviewed With patient   Patient Care Overview   Progress improving   Outcome Evaluation   Outcome Summary/Follow up Plan vitals stable, am labs, stool cx sent, X2 loose stools, check orthostatic BP, serial H and H, am labs         Problem: Diarrhea (Adult)  Goal: Identify Related Risk Factors and Signs and Symptoms  Outcome: Ongoing (interventions implemented as appropriate)

## 2017-06-12 ENCOUNTER — APPOINTMENT (OUTPATIENT)
Dept: CARDIOLOGY | Facility: HOSPITAL | Age: 82
End: 2017-06-12
Attending: INTERNAL MEDICINE

## 2017-06-12 LAB
ANION GAP SERPL CALCULATED.3IONS-SCNC: 7.9 MMOL/L
BASOPHILS # BLD AUTO: 0.05 10*3/MM3 (ref 0–0.2)
BASOPHILS NFR BLD AUTO: 0.4 % (ref 0–1.5)
BUN BLD-MCNC: 6 MG/DL (ref 8–23)
BUN/CREAT SERPL: 9 (ref 7–25)
CALCIUM SPEC-SCNC: 9 MG/DL (ref 8.6–10.5)
CHLORIDE SERPL-SCNC: 103 MMOL/L (ref 98–107)
CO2 SERPL-SCNC: 26.1 MMOL/L (ref 22–29)
CREAT BLD-MCNC: 0.67 MG/DL (ref 0.57–1)
DEPRECATED RDW RBC AUTO: 51.5 FL (ref 37–54)
EOSINOPHIL # BLD AUTO: 0.3 10*3/MM3 (ref 0–0.7)
EOSINOPHIL NFR BLD AUTO: 2.2 % (ref 0.3–6.2)
ERYTHROCYTE [DISTWIDTH] IN BLOOD BY AUTOMATED COUNT: 14.8 % (ref 11.7–13)
GFR SERPL CREATININE-BSD FRML MDRD: 83 ML/MIN/1.73
GLUCOSE BLD-MCNC: 96 MG/DL (ref 65–99)
HCT VFR BLD AUTO: 37 % (ref 35.6–45.5)
HGB BLD-MCNC: 12.2 G/DL (ref 11.9–15.5)
IMM GRANULOCYTES # BLD: 0.03 10*3/MM3 (ref 0–0.03)
IMM GRANULOCYTES NFR BLD: 0.2 % (ref 0–0.5)
LYMPHOCYTES # BLD AUTO: 2.11 10*3/MM3 (ref 0.9–4.8)
LYMPHOCYTES NFR BLD AUTO: 15.6 % (ref 19.6–45.3)
MCH RBC QN AUTO: 31.5 PG (ref 26.9–32)
MCHC RBC AUTO-ENTMCNC: 33 G/DL (ref 32.4–36.3)
MCV RBC AUTO: 95.6 FL (ref 80.5–98.2)
MONOCYTES # BLD AUTO: 1.31 10*3/MM3 (ref 0.2–1.2)
MONOCYTES NFR BLD AUTO: 9.7 % (ref 5–12)
NEUTROPHILS # BLD AUTO: 9.74 10*3/MM3 (ref 1.9–8.1)
NEUTROPHILS NFR BLD AUTO: 71.9 % (ref 42.7–76)
PLATELET # BLD AUTO: 258 10*3/MM3 (ref 140–500)
PMV BLD AUTO: 10.8 FL (ref 6–12)
POTASSIUM BLD-SCNC: 4.3 MMOL/L (ref 3.5–5.2)
RBC # BLD AUTO: 3.87 10*6/MM3 (ref 3.9–5.2)
SODIUM BLD-SCNC: 137 MMOL/L (ref 136–145)
WBC NRBC COR # BLD: 13.54 10*3/MM3 (ref 4.5–10.7)

## 2017-06-12 PROCEDURE — 80048 BASIC METABOLIC PNL TOTAL CA: CPT | Performed by: INTERNAL MEDICINE

## 2017-06-12 PROCEDURE — 83993 ASSAY FOR CALPROTECTIN FECAL: CPT | Performed by: INTERNAL MEDICINE

## 2017-06-12 PROCEDURE — 85025 COMPLETE CBC W/AUTO DIFF WBC: CPT | Performed by: INTERNAL MEDICINE

## 2017-06-12 PROCEDURE — 96361 HYDRATE IV INFUSION ADD-ON: CPT

## 2017-06-12 PROCEDURE — 99214 OFFICE O/P EST MOD 30 MIN: CPT | Performed by: INTERNAL MEDICINE

## 2017-06-12 PROCEDURE — G0378 HOSPITAL OBSERVATION PER HR: HCPCS

## 2017-06-12 RX ORDER — DEXTROSE, SODIUM CHLORIDE, AND POTASSIUM CHLORIDE 5; .45; .3 G/100ML; G/100ML; G/100ML
75 INJECTION INTRAVENOUS CONTINUOUS
Status: DISCONTINUED | OUTPATIENT
Start: 2017-06-12 | End: 2017-06-12

## 2017-06-12 RX ADMIN — POTASSIUM CHLORIDE, DEXTROSE MONOHYDRATE AND SODIUM CHLORIDE 75 ML/HR: 300; 5; 450 INJECTION, SOLUTION INTRAVENOUS at 02:41

## 2017-06-12 RX ADMIN — VITAMIN D, TAB 1000IU (100/BT) 1000 UNITS: 25 TAB at 08:31

## 2017-06-12 RX ADMIN — Medication 1 TABLET: at 08:32

## 2017-06-12 RX ADMIN — AMLODIPINE BESYLATE 5 MG: 5 TABLET ORAL at 20:26

## 2017-06-12 RX ADMIN — CALCIUM CARBONATE-VITAMIN D TAB 500 MG-200 UNIT 500 MG: 500-200 TAB at 08:31

## 2017-06-12 RX ADMIN — CALCIUM CARBONATE-VITAMIN D TAB 500 MG-200 UNIT 500 MG: 500-200 TAB at 17:35

## 2017-06-12 RX ADMIN — LISINOPRIL 40 MG: 40 TABLET ORAL at 20:25

## 2017-06-12 RX ADMIN — POTASSIUM CHLORIDE 40 MEQ: 750 CAPSULE, EXTENDED RELEASE ORAL at 00:15

## 2017-06-12 RX ADMIN — METOPROLOL TARTRATE 100 MG: 100 TABLET ORAL at 20:26

## 2017-06-12 RX ADMIN — PANTOPRAZOLE SODIUM 40 MG: 40 TABLET, DELAYED RELEASE ORAL at 06:02

## 2017-06-12 NOTE — PLAN OF CARE
Problem: Patient Care Overview (Adult)  Goal: Plan of Care Review  Outcome: Ongoing (interventions implemented as appropriate)    06/12/17 1700   Coping/Psychosocial Response Interventions   Plan Of Care Reviewed With patient   Patient Care Overview   Progress improving   Outcome Evaluation   Outcome Summary/Follow up Plan VSS. No c/o pain. GI to follow.       Goal: Adult Individualization and Mutuality  Outcome: Ongoing (interventions implemented as appropriate)  Goal: Discharge Needs Assessment  Outcome: Ongoing (interventions implemented as appropriate)    Problem: Diarrhea (Adult)  Goal: Improved/Reduced Symptoms  Outcome: Ongoing (interventions implemented as appropriate)

## 2017-06-12 NOTE — PROGRESS NOTES
"History:    This is a very nice 89-year-old lady.  She was admitted with colitis.  She is currently on both Levaquin and Flagyl.  She's been eating clear liquids.  She feels good.  She has minimal lower abdominal discomfort.  She has no diarrhea.  She has no fever or chills.    Patient's been seen by gastroenterology.  She does not wish to have a colonoscopy.    Allergies  Review of patient's allergies indicates no known allergies.      Current Facility-Administered Medications:   •  amLODIPine (NORVASC) tablet 5 mg, 5 mg, Oral, Nightly, Harley Barney MD, 5 mg at 06/11/17 2034  •  calcium-vitamin D 500-200 MG-UNIT tablet 500 mg, 500 mg, Oral, BID, Harley Barney MD, 500 mg at 06/11/17 1719  •  cholecalciferol (VITAMIN D3) tablet 1,000 Units, 1,000 Units, Oral, Daily, Harley Barney MD, 1,000 Units at 06/11/17 0811  •  lisinopril (PRINIVIL,ZESTRIL) tablet 40 mg, 40 mg, Oral, Nightly, Harley Barney MD, 40 mg at 06/11/17 2035  •  metoprolol tartrate (LOPRESSOR) tablet 100 mg, 100 mg, Oral, Nightly, Harley Barney MD, 100 mg at 06/11/17 2035  •  ondansetron (ZOFRAN) injection 4 mg, 4 mg, Intravenous, Q6H PRN, Harley Barney MD  •  pantoprazole (PROTONIX) EC tablet 40 mg, 40 mg, Oral, Q AM, Harley Barney MD, 40 mg at 06/12/17 0602  •  potassium chloride (KLOR-CON) packet 40 mEq, 40 mEq, Oral, PRN, Harley Barney MD  •  potassium chloride (MICRO-K) CR capsule 40 mEq, 40 mEq, Oral, PRN, Harley Barney MD, 40 mEq at 06/12/17 0015  •  PRESERVISION AREDS capsule 1 tablet, 1 tablet, Oral, Daily, Harley Barney MD, 1 tablet at 06/11/17 0936  •  sodium chloride 0.9 % flush 1-10 mL, 1-10 mL, Intravenous, PRN, Harley Barney MD    /74 (BP Location: Left arm, Patient Position: Lying)  Pulse 64  Temp 97.4 °F (36.3 °C) (Oral)   Resp 18  Ht 64\" (162.6 cm)  Wt 121 lb 3.2 oz (55 kg)  SpO2 95%  BMI 20.8 kg/m2    Physical Exam     Appearance: Elderly thin  lady.  She is in no distress.     HEENT: Normocephalic, atraumatic.  " Pupils round and equal.  Pharynx clear and mucous membranes moist.     Lungs: Clear to auscultation percussion     Heart: Regular rate and rhythm     Abdomen: Bowel sounds were normal.  The patient had no rebound or guarding.  She had minimal lower abdominal tenderness.     Extremities: No clubbing, cyanosis or edema         Lab Results (last 24 hours)     Procedure Component Value Units Date/Time    Hemoglobin & Hematocrit, Blood [[id number]]  (Abnormal) Collected:  06/11/17 0756    Specimen:  Blood Updated:  06/11/17 0835     Hemoglobin 12.1 g/dL      Hematocrit 34.7 (L) %     Stool Culture [[id number]] Collected:  06/10/17 2243    Specimen:  Stool from Per Rectum Updated:  06/11/17 1044     Stool Culture Culture in progress    Sedimentation Rate [[id number]]  (Normal) Collected:  06/11/17 0756    Specimen:  Blood Updated:  06/11/17 1154     Sed Rate 20 mm/hr     Hemoglobin & Hematocrit, Blood [[id number]]  (Abnormal) Collected:  06/11/17 1552    Specimen:  Blood Updated:  06/11/17 1640     Hemoglobin 11.5 (L) g/dL      Hematocrit 34.0 (L) %     CBC & Differential [[id number]] Collected:  06/12/17 0617    Specimen:  Blood Updated:  06/12/17 0646    Narrative:       The following orders were created for panel order CBC & Differential.  Procedure                               Abnormality         Status                     ---------                               -----------         ------                     CBC Auto Differential[[id number]]        Abnormal            Final result                 Please view results for these tests on the individual orders.    CBC Auto Differential [[id number]]  (Abnormal) Collected:  06/12/17 0617    Specimen:  Blood Updated:  06/12/17 0646     WBC 13.54 (H) 10*3/mm3      RBC 3.87 (L) 10*6/mm3      Hemoglobin 12.2 g/dL      Hematocrit 37.0 %      MCV 95.6 fL      MCH 31.5 pg      MCHC 33.0 g/dL      RDW 14.8 (H) %      RDW-SD 51.5 fl      MPV 10.8 fL      Platelets 258 10*3/mm3       Neutrophil % 71.9 %      Lymphocyte % 15.6 (L) %      Monocyte % 9.7 %      Eosinophil % 2.2 %      Basophil % 0.4 %      Immature Grans % 0.2 %      Neutrophils, Absolute 9.74 (H) 10*3/mm3      Lymphocytes, Absolute 2.11 10*3/mm3      Monocytes, Absolute 1.31 (H) 10*3/mm3      Eosinophils, Absolute 0.30 10*3/mm3      Basophils, Absolute 0.05 10*3/mm3      Immature Grans, Absolute 0.03 10*3/mm3     Basic Metabolic Panel [[id number]]  (Abnormal) Collected:  06/12/17 0617    Specimen:  Blood Updated:  06/12/17 0710     Glucose 96 mg/dL      BUN 6 (L) mg/dL      Creatinine 0.67 mg/dL      Sodium 137 mmol/L      Potassium 4.3 mmol/L      Chloride 103 mmol/L      CO2 26.1 mmol/L      Calcium 9.0 mg/dL      eGFR Non African Amer 83 mL/min/1.73      BUN/Creatinine Ratio 9.0     Anion Gap 7.9 mmol/L     Narrative:       The MDRD GFR formula is only valid for adults with stable renal function between ages 18 and 70.          Imp:    1.  Lower GI hemorrhage: Possible etiologies colitis, diverticular disease, ischemia.  Patient has no further bleeding.  She has little abdominal discomfort.   Her white count is coming down.     2.  History of essential hypertension.      3.  Osteoporosis.      4.  Macular degeneration.          Plan:    We will advance her diet.  I will stop her IV fluids.    I do not believe we need to do serial H&H's as her hemoglobin is stable.  These have been canceled    I've ordered a fecal calprotectin.    I wonder if we should do a CT angiogram of her abdomen and pelvis to further assess her vascular status.    The patient does well and went to discharge her tomorrow on by mouth antibiotics    Harley Barney MD  6/12/2017  7:35 AM

## 2017-06-13 ENCOUNTER — APPOINTMENT (OUTPATIENT)
Dept: CARDIOLOGY | Facility: HOSPITAL | Age: 82
End: 2017-06-13
Attending: INTERNAL MEDICINE

## 2017-06-13 VITALS
HEIGHT: 64 IN | HEART RATE: 70 BPM | RESPIRATION RATE: 16 BRPM | DIASTOLIC BLOOD PRESSURE: 67 MMHG | SYSTOLIC BLOOD PRESSURE: 121 MMHG | WEIGHT: 116 LBS | OXYGEN SATURATION: 97 % | TEMPERATURE: 98 F | BODY MASS INDEX: 19.81 KG/M2

## 2017-06-13 LAB
BACTERIA SPEC AEROBE CULT: NORMAL
BACTERIA SPEC AEROBE CULT: NORMAL
BASOPHILS # BLD AUTO: 0.04 10*3/MM3 (ref 0–0.2)
BASOPHILS NFR BLD AUTO: 0.4 % (ref 0–1.5)
BH CV VAS SMA 1ST PP CELIAC EDV: 21 CM/S
BH CV VAS SMA 1ST PP CELIAC PSV: 128 CM/S
BH CV VAS SMA 1ST PP SMA EDV: 52 CM/S
BH CV VAS SMA 1ST PP SMA PSV: 221 CM/S
BH CV VAS SMA 1ST PP TIME: 15 MIN
BH CV VAS SMA 2ND PP TIME: 30 MIN
BH CV VAS SMA 3RD PP TIME: 45 MIN
BH CV VAS SMA AORTA EDV: 0 CM/S
BH CV VAS SMA AORTA PSV: 79 CM/S
BH CV VAS SMA CELIAC DIST EDV: 30 CM/S
BH CV VAS SMA CELIAC DIST PSV: 136 CM/S
BH CV VAS SMA CELIAC MID EDV: 26 CM/S
BH CV VAS SMA CELIAC MID PSV: 137 CM/S
BH CV VAS SMA CELIAC PROX EDV: 21 CM/S
BH CV VAS SMA CELIAC PROX PSV: 100 CM/S
BH CV VAS SMA HEPATIC EDV: 33 CM/S
BH CV VAS SMA HEPATIC PSV: 129 CM/S
BH CV VAS SMA IMA EDV: 0 CM/S
BH CV VAS SMA IMA PSV: 115 CM/S
BH CV VAS SMA ORIGIN EDV: 0 CM/S
BH CV VAS SMA ORIGIN PSV: 104 CM/S
BH CV VAS SMA SMA DIST EDV: 0 CM/S
BH CV VAS SMA SMA DIST PSV: 87 CM/S
BH CV VAS SMA SMA MID EDV: 0 CM/S
BH CV VAS SMA SMA MID PSV: 83 CM/S
BH CV VAS SMA SMA PROX EDV: 0 CM/S
BH CV VAS SMA SMA PROX PSV: 112 CM/S
BH CV VAS SMA SPLENIC EDV: 29 CM/S
BH CV VAS SMA SPLENIC PSV: 142 CM/S
DEPRECATED RDW RBC AUTO: 50.8 FL (ref 37–54)
EOSINOPHIL # BLD AUTO: 0.44 10*3/MM3 (ref 0–0.7)
EOSINOPHIL NFR BLD AUTO: 4.9 % (ref 0.3–6.2)
ERYTHROCYTE [DISTWIDTH] IN BLOOD BY AUTOMATED COUNT: 14.7 % (ref 11.7–13)
HCT VFR BLD AUTO: 36.2 % (ref 35.6–45.5)
HGB BLD-MCNC: 11.9 G/DL (ref 11.9–15.5)
IMM GRANULOCYTES # BLD: 0.03 10*3/MM3 (ref 0–0.03)
IMM GRANULOCYTES NFR BLD: 0.3 % (ref 0–0.5)
LYMPHOCYTES # BLD AUTO: 1.73 10*3/MM3 (ref 0.9–4.8)
LYMPHOCYTES NFR BLD AUTO: 19.1 % (ref 19.6–45.3)
MCH RBC QN AUTO: 31.2 PG (ref 26.9–32)
MCHC RBC AUTO-ENTMCNC: 32.9 G/DL (ref 32.4–36.3)
MCV RBC AUTO: 95 FL (ref 80.5–98.2)
MONOCYTES # BLD AUTO: 0.86 10*3/MM3 (ref 0.2–1.2)
MONOCYTES NFR BLD AUTO: 9.5 % (ref 5–12)
NEUTROPHILS # BLD AUTO: 5.97 10*3/MM3 (ref 1.9–8.1)
NEUTROPHILS NFR BLD AUTO: 65.8 % (ref 42.7–76)
PLATELET # BLD AUTO: 267 10*3/MM3 (ref 140–500)
PMV BLD AUTO: 10.6 FL (ref 6–12)
RBC # BLD AUTO: 3.81 10*6/MM3 (ref 3.9–5.2)
WBC NRBC COR # BLD: 9.07 10*3/MM3 (ref 4.5–10.7)

## 2017-06-13 PROCEDURE — 99213 OFFICE O/P EST LOW 20 MIN: CPT | Performed by: NURSE PRACTITIONER

## 2017-06-13 PROCEDURE — 85025 COMPLETE CBC W/AUTO DIFF WBC: CPT | Performed by: INTERNAL MEDICINE

## 2017-06-13 PROCEDURE — G0378 HOSPITAL OBSERVATION PER HR: HCPCS

## 2017-06-13 PROCEDURE — 93975 VASCULAR STUDY: CPT

## 2017-06-13 RX ORDER — LEVOFLOXACIN 500 MG/1
500 TABLET, FILM COATED ORAL DAILY
Qty: 3 TABLET | Refills: 0 | Status: SHIPPED | OUTPATIENT
Start: 2017-06-13 | End: 2019-05-15

## 2017-06-13 RX ORDER — METRONIDAZOLE 500 MG/1
500 TABLET ORAL 3 TIMES DAILY
Qty: 12 TABLET | Refills: 0 | Status: SHIPPED | OUTPATIENT
Start: 2017-06-13 | End: 2019-05-15

## 2017-06-13 RX ADMIN — Medication 1 TABLET: at 09:47

## 2017-06-13 RX ADMIN — VITAMIN D, TAB 1000IU (100/BT) 1000 UNITS: 25 TAB at 09:46

## 2017-06-13 RX ADMIN — CALCIUM CARBONATE-VITAMIN D TAB 500 MG-200 UNIT 500 MG: 500-200 TAB at 09:46

## 2017-06-13 NOTE — PLAN OF CARE
Problem: Patient Care Overview (Adult)  Goal: Plan of Care Review  Outcome: Ongoing (interventions implemented as appropriate)    06/13/17 0948 06/13/17 1156   Coping/Psychosocial Response Interventions   Plan Of Care Reviewed With patient --    Patient Care Overview   Progress --  improving   Outcome Evaluation   Outcome Summary/Follow up Plan --  pt vss. NO pain reported. mesenteric duplex completed today. D/c to follow       Goal: Adult Individualization and Mutuality  Outcome: Ongoing (interventions implemented as appropriate)    06/13/17 1156   Individualization   Patient Specific Preferences patient wants to go home          Problem: Diarrhea (Adult)  Goal: Improved/Reduced Symptoms  Outcome: Ongoing (interventions implemented as appropriate)    06/13/17 1156   Diarrhea (Adult)   Improved/Reduced Symptoms making progress toward outcome

## 2017-06-13 NOTE — PLAN OF CARE
Problem: Patient Care Overview (Adult)  Goal: Plan of Care Review  Outcome: Ongoing (interventions implemented as appropriate)    06/13/17 0104   Coping/Psychosocial Response Interventions   Plan Of Care Reviewed With patient;spouse   Patient Care Overview   Progress progress toward functional goals as expected         Problem: Diarrhea (Adult)  Goal: Improved/Reduced Symptoms  Outcome: Ongoing (interventions implemented as appropriate)    06/13/17 0104   Diarrhea (Adult)   Improved/Reduced Symptoms making progress toward outcome

## 2017-06-13 NOTE — PROGRESS NOTES
[facility]/GI Progress Note   Chief Complaint:  Colitis     Subjective     Interval History:   Just returned from dopplers. No GI complaints. Anxious to go home soon. Denies abdominal pain, nausea or vomiting. No blood in stools    History taken from: patient chart    Review of Systems:    The following systems were reviewed and negative;  gastrointestinal    Objective     Vital Signs  Temp:  [97.8 °F (36.6 °C)-98 °F (36.7 °C)] 98 °F (36.7 °C)  Heart Rate:  [59-77] 70  Resp:  [16-18] 16  BP: (121-158)/(59-72) 121/67  Body mass index is 19.91 kg/(m^2).  No intake or output data in the 24 hours ending 06/13/17 0855       Physical Exam:   General: patient awake, alert and cooperative   Eyes: Normal lids and lashes, no scleral icterus, no conjunctival pallor   Neck: supple, normal ROM, no tracheal deviation, no thyromegaly   Skin: warm and dry, not jaundiced   Cardiovascular: regular rhythm and rate, no murmurs auscultated   Pulm: clear to auscultation bilaterally, regular and unlabored   Abdomen: soft, nontender, nondistended; normal bowel sounds   Rectal: deferred   Extremities: no rash or edema   Neurologic: Normal mood and behavior    All Medications Have Been Reviewed     Results Review:       Results from last 7 days  Lab Units 06/13/17  0721 06/12/17 0617 06/11/17  1552  06/11/17  0529   WBC 10*3/mm3 9.07 13.54*  --   --  16.04*   HEMOGLOBIN g/dL 11.9 12.2 11.5*  < > 11.4*   HEMATOCRIT % 36.2 37.0 34.0*  < > 33.3*   PLATELETS 10*3/mm3 267 258  --   --  238   < > = values in this interval not displayed.      Results from last 7 days  Lab Units 06/12/17  0617 06/11/17  0529 06/10/17  0938   SODIUM mmol/L 137 135* 135*   POTASSIUM mmol/L 4.3 3.4* 3.3*   CHLORIDE mmol/L 103 99 93*   TOTAL CO2 mmol/L 26.1 24.2 25.5   BUN mg/dL 6* 11 21   CREATININE mg/dL 0.67 0.59 1.00   CALCIUM mg/dL 9.0 8.3* 9.6   BILIRUBIN mg/dL  --   --  0.8   ALK PHOS U/L  --   --  58   ALT (SGPT) U/L  --   --  12   AST (SGOT) U/L  --   --  17    GLUCOSE mg/dL 96 133* 128*         Results from last 7 days  Lab Units 06/10/17  1705 06/10/17  0938   INR  1.09 0.99       RADIOLOGY:    Imaging Results (last 72 hours)     Procedure Component Value Units Date/Time    CT Abdomen Pelvis With Contrast [[id number]] Collected:  06/10/17 1116     Updated:  06/10/17 1133    Narrative:       CT ABDOMEN PELVIS W CONTRAST-     INDICATIONS: Abdominal pain, rectal bleeding     TECHNIQUE: Enhanced ABDOMEN AND PELVIS CT     COMPARISON: None available     FINDINGS:      Multiple tiny cysts and low densities too small to characterize are seen  in the liver. Metallic radiodensity is apparent in the right aspect of  the liver.     Areas of cortical thinning are apparent in both kidneys. Tiny right  renal low density too small to characterize, interval follow-up could be  obtained.           Otherwise unremarkable appearance of the liver, spleen, adrenal glands,  pancreas, kidneys, bladder.     No bowel obstruction. Conspicuous wall thickening is seen in the colon  from May mid aspect of the descending colon to the distal aspect of the  sigmoid colon, compatible with nonspecific colitis. Surrounding fat  stranding and small fluid are present. Numerous colonic diverticula are  seen that do not appear focally inflamed. Appendix is not definitely  identified, limiting assessment, correlate clinically.     No free intraperitoneal gas. Mild pelvic ascites is present.     Scattered small mesenteric and para-aortic lymph nodes are seen that are  not significant by size criteria.     Abdominal aorta is not aneurysmal. Aortic and other arterial  calcifications are present.     The lung bases show small likely atelectasis or scarring. A subpleural  nodular density right lower lobe measuring 7 mm on 4 of series 1, 3  month follow-up chest CT is advised to characterize change. Small  nodular right lower lobe density is also seen more posteriorly, 3 mm. In  the right lower lobe, 5 mm nodule is  seen on image 18. Left lower lobe,  4 mm nodule is apparent, image 13. In the left lower lobe a 4 mm nodule  anteriorly, image 9.     Mild to moderate pericardial effusion is apparent, only partly included.  Trace right pleural effusion.     Degenerative changes are seen in the spine. Old right pubic ramus  fracture is noted. Somewhat linear sclerotic change at the midline of  S1, could be result of old healed injury, possibility of the lesion  seems less likely, follow-up could characterize change, bone scan could  be considered for further evaluation. No acute fracture is identified.             Impression:          1. Evidence of nonspecific colitis of the distal colon. Mild free fluid  in the pelvis.  Colonic diverticulosis.  2. No obstructive uropathy. Multiple small hepatic cysts and low  densities too small to characterize, small right renal low density too  small to characterize.  3. Mild to moderate pericardial effusion, only partly included.  4. Multiple small pulmonary nodules, three-month follow-up chest CT is  advised to characterize change.     Discussed by telephone with Dr. Landers at time of interpretation, 1127 on  06/10/2017.     This report was finalized on 6/10/2017 11:30 AM by Dr. Patricio Stevenson MD.       XR Chest PA & Lateral [[id number]] Collected:  06/10/17 1816     Updated:  06/10/17 1817    Narrative:       PA AND LATERAL CHEST X-RAY     HISTORY: Pulmonary nodules; K52.9-Noninfective gastroenteritis and  colitis, unspecified; K62.5-Hemorrhage of anus and rectum;  I95.1-Orthostatic hypotension; I31.3-Pericardial effusion  (noninflammatory).     COMPARISON: None.     FINDINGS: PA and lateral views of the chest were obtained. The lungs are  hyperexpanded suggesting COPD. There is underlying emphysema. Some  linear scarring present in the right perihilar region. No active  airspace disease, edema, or pleural fluid. Mild cardiomegaly. There is  S-shaped thoracolumbar scoliosis. Generalized  osteopenia. There are old  appearing right rib fractures.       Impression:       Emphysema/COPD with right perihilar scarring.                Assessment/Plan     Patient Active Problem List   Diagnosis Code   • Osteoporosis M81.0   • Colitis K52.9      Impression:  1. L sided colitis - ? Small vessel ischemia vs infectious  2. Abdominal pain secondary to above- improving  3. Leukocytosis secondary to above- resolved   4. Lower GI bleed- resolved    Recommendations:  Await stool studies- stool culture preliminary negative. Calprotectin pending  Diet advanced   Cont IV abx at this time   Patient does not wish to pursue endoscopic eval  Await mesenteric doppler study results assess vascular status -    Follow up July 6 2017 at 1130am     TAVO Monae  06/13/17  8:55 AM

## 2017-06-13 NOTE — DISCHARGE SUMMARY
DATE OF ADMISSION: 06/10/2017   DATE OF DISCHARGE: 06/13/2017     DISCHARGE DIAGNOSES:  1.  Lower gastrointestinal hemorrhage, possible etiologies include colitis, diverticular disease, and ischemia.    2.  Essential hypertension.    3.  Osteoporosis.     4.  Macular degeneration.     PROCEDURE: During this admission none.       HISTORY OF PRESENT ILLNESS: The patient is a nice 89-year-old lady who presented after a 12-hour history of lower abdominal cramps, nausea, vomiting and loose bowel movements which turned to bloody stool. The patient was admitted through the emergency room. She was a little bit hypotensive on admission. She was started on IV fluids.      PHYSICAL EXAMINATION:  VITAL SIGNS: At the time of discharge, blood pressure 130/70, pulse 72, respiratory rate 18, temperature was afebrile, height 64 inches, weight 121 pounds, BMI 20.8, O2 saturation 95% on room air.   GENERAL APPEARANCE: Elderly, thin,  lady in no distress.   HEENT: Normocephalic and atraumatic. Pupils round and equal. Pharynx clear. Mucous membranes moist.   NECK: Decreased range of motion. No lymphadenopathy, JVD, or thyromegaly.   LUNGS: Clear to auscultation and percussion.   HEART: Regular rate and rhythm. No murmur, gallop, or rub.   ABDOMEN: Normoactive bowel sounds. Very minimal lower abdominal tenderness. No rebound or guarding. No hepatosplenomegaly.   EXTREMITIES: Without clubbing, cyanosis, or edema.   JOINTS: OA changes in hands and feet.   NEUROLOGIC: Nonfocal.      DIAGNOSTIC DATA: Significant laboratory studies - Calprotectin (fecal) is pending. CBC at discharge had a white count of 9.07, hemoglobin of 11.9, hematocrit 36.2, MCV 95, and platelet count 267,000. On admission, white count was 18,000. Comprehensive metabolic panel was within normal limits. BMP at the time of discharge was unremarkable. Sedimentation rate was 20. TSH was normal at 1.59. Magnesium level normal at 1.6. Stool cultures demonstrated no  Salmonella, Shigella, Campylobacter   or E. coli, normal fecal jessica. Urinalysis was normal. PT/INR was normal. Lipase was normal. Chest x-ray PA and lateral demonstrated findings consistent with COPD. She had some perihilar scarring. CT of the abdomen and pelvis demonstrated no obstructive uropathy, mild to moderate pericardial effusion, evidence of nonspecific colitis of the distal colon, colonic diverticulosis. Ultrasound of her celiac and mesenteric arteries were normal. No hemodynamically significant lesion. EKG was sinus rhythm with LVH.     HOSPITAL COURSE: The patient was admitted and she was given IV fluids. She was started on IV Flagyl and Levaquin. She was placed on clear liquids. She was seen by Gastroenterology and colonoscopy was recommended. The patient declined to have this study done. The studies listed above were done. The patient's diet was advanced. She had no further abdominal pain, nausea, vomiting, or bloody stool. On 06/13/2017, she is being discharged to home.     DIET: She will eat a healthy heart diet.     DISCHARGE MEDICATIONS:    1.  Amlodipine 5 mg a day.    2.  Lisinopril 40 mg a day.   3.  Toprol- mg a day.   4.  Vitamin D 2000 international units daily.   5.  Diazepam 10 mg at night p.r.n.   6.  Preservision b.i.d.    7.  Calcium plus D b.i.d.    8.  Flagyl 500 t.i.d. for 4 days.    9.  Levaquin 500 mg daily for the next 3 days.     FOLLOWUP:   1.  I will see the patient in my office in about a week.    2.  She will follow up with Courtney Chang MD in the not too distant future.     It should be noted this patient is a NO CODE.     ACTIVITIES: Ad blanca.         BIMAL CarlsonO:elba  D:   06/13/2017 15:54:29  T:   06/13/2017 18:49:28  Job ID:   [id number]  Document ID:   [id number]  cc:

## 2017-06-14 LAB
ABO + RH BLD: NORMAL
ABO + RH BLD: NORMAL
BH BB BLOOD EXPIRATION DATE: NORMAL
BH BB BLOOD EXPIRATION DATE: NORMAL
BH BB BLOOD TYPE BARCODE: 9500
BH BB BLOOD TYPE BARCODE: 9500
BH BB DISPENSE STATUS: NORMAL
BH BB DISPENSE STATUS: NORMAL
BH BB PRODUCT CODE: NORMAL
BH BB PRODUCT CODE: NORMAL
BH BB UNIT NUMBER: NORMAL
BH BB UNIT NUMBER: NORMAL
CROSSMATCH INTERPRETATION: NORMAL
CROSSMATCH INTERPRETATION: NORMAL
UNIT  ABO: NORMAL
UNIT  ABO: NORMAL
UNIT  RH: NORMAL
UNIT  RH: NORMAL

## 2017-06-16 LAB — CALPROTECTIN STL-MCNT: 365 UG/G (ref 0–120)

## 2018-07-24 NOTE — CONSULTS
Date:  7/24/2018    Medication:  Concerta 18 mg 2x daily, Ambiem 12.5 mg 1x daily     [] Patient completely out of medication     Message to Prescriber: 90 day supply for each medication     If no future appointment scheduled, patient was contacted.   Outcome:   []  PSAR left voicemail for patient to contact the clinic to schedule a follow up   []  PSAR called patient and phone number was disconnected   []  PSAR called patient and voicemail box was full   []  PSAR spoke to patient and follow up was scheduled   [facility]  Initial Inpatient Consult Note    Referring Provider: Dr. Barney    Reason for Consultation: Colitis    Subjective     History of present illness:    This is a pleasant 89-year-old white female unknown to our practice who presents for evaluation of abdominal pain and hematochezia.  Patient reports her symptoms started rather suddenly yesterday after lunch.  The patient apparently had a tuna melt and shortly after arriving home developed some crampy mid abdominal pain followed by some fecal urgency.  She had some loose stool with a scant amount of blood noted in the toilet.  The symptoms persisted through the evening and she presented to the emergency room for evaluation.  CT scan performed showed evidence of a mild left-sided colitis and she did have a modest elevation of her white blood cell count as well.  She has been started on empiric antibiotics and overall has had improvement in her symptoms since admission.  She denies any recent sick contacts.  She's had no prior similar episodes.  She tells me her last colonoscopy was performed greater than 10 years ago and was normal per her recollection.    Past Medical History:  Past Medical History:   Diagnosis Date   • Anemia    • History of irregular heartbeat    • Hypertension    • Macular degeneration    • Osteoporosis    • Osteoporosis    • PNA (pneumonia)     25 years ago   • Urinary tract infection        Past Surgical History:  Past Surgical History:   Procedure Laterality Date   • APPENDECTOMY     • SKIN CANCER EXCISION          Social History:   Social History   Substance Use Topics   • Smoking status: Former Smoker   • Smokeless tobacco: Not on file      Comment: quit 25 years ago   • Alcohol use 1.2 oz/week     2 Glasses of wine per week      Comment: social        Family History:  History reviewed. No pertinent family history.    Home Meds:  Prescriptions Prior to Admission   Medication Sig Dispense Refill Last Dose   •  amLODIPine (NORVASC) 10 MG tablet Take 5 mg by mouth Daily.   6/10/2017 at Unknown time   • Calcium Carbonate-Vitamin D (CALCIUM + D PO) Take  by mouth daily.   6/10/2017 at Unknown time   • cholecalciferol (VITAMIN D3) 1000 UNITS tablet Take 1,000 Units by mouth daily.   6/10/2017 at Unknown time   • denosumab (PROLIA) 60 MG/ML solution syringe Inject 60 mg under the skin Every 6 (Six) Months.      • lisinopril (PRINIVIL,ZESTRIL) 10 MG tablet Take 2 tablets by mouth daily. (Patient taking differently: Take 40 mg by mouth daily.) 30 tablet 0 6/10/2017 at Unknown time   • metoprolol tartrate (LOPRESSOR) 25 MG tablet Take 100 mg by mouth daily.   6/10/2017 at Unknown time   • phenazopyridine (PYRIDIUM) 200 MG tablet One tablet 3 x's a day as needed for symptoms 10 tablet 0 Not Taking       Current Meds:     amLODIPine 5 mg Oral Nightly   calcium-vitamin D 500 mg Oral BID   cholecalciferol 1,000 Units Oral Daily   lisinopril 40 mg Oral Nightly   metoprolol tartrate 100 mg Oral Nightly   pantoprazole 40 mg Oral Q AM   PRESERVISION AREDS 1 tablet Oral Daily       Allergies:  No Known Allergies    Review of Systems  All systems were reviewed and negative except for:  Constitution:  positive for fatigue  Gastrointestinal: postitive for  bright red blood per rectum, diarrhea and pain     Objective     Vital Signs  Temp:  [97.8 °F (36.6 °C)-98.5 °F (36.9 °C)] 97.8 °F (36.6 °C)  Heart Rate:  [63-69] 65  Resp:  [18-20] 20  BP: (144-165)/(70-80) 149/76    Physical Exam:  General Appearance:    Alert, cooperative, in no acute distress   Head:    Normocephalic, without obvious abnormality, atraumatic   Eyes:            Lids and lashes normal, conjunctivae and sclerae normal, no   icterus   Throat:   No oral lesions, no thrush, oral mucosa moist   Neck:   No adenopathy, supple, trachea midline, no thyromegaly, no   carotid bruit, no JVD   Lungs:     Clear to auscultation,respirations regular, even and                   unlabored     Heart:    Regular rhythm and normal rate, normal S1 and S2, no            murmur, no gallop, no rub, no click   Chest Wall:    No abnormalities observed   Abdomen:     Normal bowel sounds, no masses, no organomegaly, soft        Mild diffuse lower abd tenderness, non-distended, no guarding, no rebound                 tenderness   Rectal:     Deferred   Extremities:   no edema, no cyanosis, no redness   Skin:   No bleeding, bruising or rash   Lymph nodes:   No palpable adenopathy   Psychiatric:  Judgement and insight: normal   Orientation to person place and time: normal   Mood and affect: normal     Results Review:   I reviewed the patient's new clinical results.  I reviewed the patient's new imaging results and agree with the interpretation.      Results from last 7 days  Lab Units 06/11/17  0756 06/11/17  0529 06/10/17  2355 06/10/17  0938   WBC 10*3/mm3  --  16.04*  --  17.72*   HEMOGLOBIN g/dL 12.1 11.4* 11.4* 13.7   HEMATOCRIT % 34.7* 33.3* 33.8* 39.6   PLATELETS 10*3/mm3  --  238  --  292         Results from last 7 days  Lab Units 06/11/17  0529 06/10/17  0938   SODIUM mmol/L 135* 135*   POTASSIUM mmol/L 3.4* 3.3*   CHLORIDE mmol/L 99 93*   TOTAL CO2 mmol/L 24.2 25.5   BUN mg/dL 11 21   CREATININE mg/dL 0.59 1.00   CALCIUM mg/dL 8.3* 9.6   BILIRUBIN mg/dL  --  0.8   ALK PHOS U/L  --  58   ALT (SGPT) U/L  --  12   AST (SGOT) U/L  --  17   GLUCOSE mg/dL 133* 128*         Results from last 7 days  Lab Units 06/10/17  1705 06/10/17  0938   INR  1.09 0.99         0  Lab Value Date/Time   LIPASE 31 06/10/2017 0938       Radiology:  Imaging Results (last 72 hours)     Procedure Component Value Units Date/Time    CT Abdomen Pelvis With Contrast [[id number]] Collected:  06/10/17 1116     Updated:  06/10/17 1133    Narrative:       CT ABDOMEN PELVIS W CONTRAST-     INDICATIONS: Abdominal pain, rectal bleeding     TECHNIQUE: Enhanced ABDOMEN AND PELVIS CT     COMPARISON: None available     FINDINGS:      Multiple tiny  cysts and low densities too small to characterize are seen  in the liver. Metallic radiodensity is apparent in the right aspect of  the liver.     Areas of cortical thinning are apparent in both kidneys. Tiny right  renal low density too small to characterize, interval follow-up could be  obtained.           Otherwise unremarkable appearance of the liver, spleen, adrenal glands,  pancreas, kidneys, bladder.     No bowel obstruction. Conspicuous wall thickening is seen in the colon  from May mid aspect of the descending colon to the distal aspect of the  sigmoid colon, compatible with nonspecific colitis. Surrounding fat  stranding and small fluid are present. Numerous colonic diverticula are  seen that do not appear focally inflamed. Appendix is not definitely  identified, limiting assessment, correlate clinically.     No free intraperitoneal gas. Mild pelvic ascites is present.     Scattered small mesenteric and para-aortic lymph nodes are seen that are  not significant by size criteria.     Abdominal aorta is not aneurysmal. Aortic and other arterial  calcifications are present.     The lung bases show small likely atelectasis or scarring. A subpleural  nodular density right lower lobe measuring 7 mm on 4 of series 1, 3  month follow-up chest CT is advised to characterize change. Small  nodular right lower lobe density is also seen more posteriorly, 3 mm. In  the right lower lobe, 5 mm nodule is seen on image 18. Left lower lobe,  4 mm nodule is apparent, image 13. In the left lower lobe a 4 mm nodule  anteriorly, image 9.     Mild to moderate pericardial effusion is apparent, only partly included.  Trace right pleural effusion.     Degenerative changes are seen in the spine. Old right pubic ramus  fracture is noted. Somewhat linear sclerotic change at the midline of  S1, could be result of old healed injury, possibility of the lesion  seems less likely, follow-up could characterize change, bone scan could  be  considered for further evaluation. No acute fracture is identified.             Impression:          1. Evidence of nonspecific colitis of the distal colon. Mild free fluid  in the pelvis.  Colonic diverticulosis.  2. No obstructive uropathy. Multiple small hepatic cysts and low  densities too small to characterize, small right renal low density too  small to characterize.  3. Mild to moderate pericardial effusion, only partly included.  4. Multiple small pulmonary nodules, three-month follow-up chest CT is  advised to characterize change.     Discussed by telephone with Dr. Landers at time of interpretation, 1127 on  06/10/2017.     This report was finalized on 6/10/2017 11:30 AM by Dr. Patricio Stevenson MD.       XR Chest PA & Lateral [[id number]] Collected:  06/10/17 1816     Updated:  06/10/17 1817    Narrative:       PA AND LATERAL CHEST X-RAY     HISTORY: Pulmonary nodules; K52.9-Noninfective gastroenteritis and  colitis, unspecified; K62.5-Hemorrhage of anus and rectum;  I95.1-Orthostatic hypotension; I31.3-Pericardial effusion  (noninflammatory).     COMPARISON: None.     FINDINGS: PA and lateral views of the chest were obtained. The lungs are  hyperexpanded suggesting COPD. There is underlying emphysema. Some  linear scarring present in the right perihilar region. No active  airspace disease, edema, or pleural fluid. Mild cardiomegaly. There is  S-shaped thoracolumbar scoliosis. Generalized osteopenia. There are old  appearing right rib fractures.       Impression:       Emphysema/COPD with right perihilar scarring.                Assessment/Plan     Active Problems:    Colitis      Impression:  1. L sided colitis - ? Small vessel ischemia vs infectious  2. Abdominal pain secondary to above  3. Leukocytosis secondary to above    Recommendations:  Await stool studies  Agree with IV abx at this time   Monitor WBC count    I would usually recommend an interval colonoscopy in a situation like this to ensure  resolution and rule out any underlying additional pathology, but the patient tells me that she is adamant she will not undergo colonoscopy in the future.    I discussed the patients findings and my recommendations with patient          Eron Aggarwal M.D.  [facility]  [address] [place] [address]  Office: [phone]

## 2019-05-06 ENCOUNTER — TRANSCRIBE ORDERS (OUTPATIENT)
Dept: ADMINISTRATIVE | Facility: HOSPITAL | Age: 84
End: 2019-05-06

## 2019-05-06 DIAGNOSIS — M81.0 SENILE OSTEOPOROSIS: Primary | ICD-10-CM

## 2019-05-15 ENCOUNTER — INFUSION (OUTPATIENT)
Dept: ONCOLOGY | Facility: HOSPITAL | Age: 84
End: 2019-05-15

## 2019-05-15 VITALS
SYSTOLIC BLOOD PRESSURE: 147 MMHG | HEART RATE: 63 BPM | WEIGHT: 126.6 LBS | BODY MASS INDEX: 21.61 KG/M2 | OXYGEN SATURATION: 95 % | HEIGHT: 64 IN | DIASTOLIC BLOOD PRESSURE: 79 MMHG | TEMPERATURE: 98.2 F | RESPIRATION RATE: 16 BRPM

## 2019-05-15 DIAGNOSIS — M81.0 OSTEOPOROSIS, UNSPECIFIED OSTEOPOROSIS TYPE, UNSPECIFIED PATHOLOGICAL FRACTURE PRESENCE: Primary | ICD-10-CM

## 2019-05-15 PROCEDURE — 25010000002 DENOSUMAB 60 MG/ML SOLUTION PREFILLED SYRINGE: Performed by: NURSE PRACTITIONER

## 2019-05-15 PROCEDURE — 96372 THER/PROPH/DIAG INJ SC/IM: CPT | Performed by: NURSE PRACTITIONER

## 2019-05-15 RX ADMIN — DENOSUMAB 60 MG: 60 INJECTION SUBCUTANEOUS at 15:57

## 2019-05-15 NOTE — PROGRESS NOTES
Arrived ambulatory for prolia injection. Indication and side effects reviewed. Denies recent dental work. Labs and medications verified. Prolia administered in left arm without incidence. Instructed to call prescribing MD for any concerns or questions and instructed on how to schedule future appts.  Pt vu and discharged ambulatory.

## 2019-05-17 ENCOUNTER — APPOINTMENT (OUTPATIENT)
Dept: ONCOLOGY | Facility: HOSPITAL | Age: 84
End: 2019-05-17

## 2019-07-09 ENCOUNTER — APPOINTMENT (OUTPATIENT)
Dept: CT IMAGING | Facility: HOSPITAL | Age: 84
End: 2019-07-09

## 2019-07-09 ENCOUNTER — HOSPITAL ENCOUNTER (EMERGENCY)
Facility: HOSPITAL | Age: 84
Discharge: HOME | End: 2019-07-09
Attending: EMERGENCY MEDICINE | Admitting: EMERGENCY MEDICINE

## 2019-07-09 VITALS
RESPIRATION RATE: 18 BRPM | DIASTOLIC BLOOD PRESSURE: 99 MMHG | OXYGEN SATURATION: 96 % | HEART RATE: 69 BPM | TEMPERATURE: 98.5 F | HEIGHT: 64 IN | SYSTOLIC BLOOD PRESSURE: 193 MMHG | WEIGHT: 124.2 LBS | BODY MASS INDEX: 21.21 KG/M2

## 2019-07-09 DIAGNOSIS — S01.01XA LACERATION OF SCALP, INITIAL ENCOUNTER: Primary | ICD-10-CM

## 2019-07-09 PROCEDURE — 70450 CT HEAD/BRAIN W/O DYE: CPT

## 2019-07-09 PROCEDURE — 25010000002 TDAP 5-2.5-18.5 LF-MCG/0.5 SUSPENSION: Performed by: EMERGENCY MEDICINE

## 2019-07-09 PROCEDURE — 99283 EMERGENCY DEPT VISIT LOW MDM: CPT

## 2019-07-09 PROCEDURE — 90715 TDAP VACCINE 7 YRS/> IM: CPT | Performed by: EMERGENCY MEDICINE

## 2019-07-09 PROCEDURE — 90471 IMMUNIZATION ADMIN: CPT | Performed by: EMERGENCY MEDICINE

## 2019-07-09 RX ORDER — LIDOCAINE HYDROCHLORIDE AND EPINEPHRINE 10; 10 MG/ML; UG/ML
10 INJECTION, SOLUTION INFILTRATION; PERINEURAL ONCE
Status: COMPLETED | OUTPATIENT
Start: 2019-07-09 | End: 2019-07-09

## 2019-07-09 RX ADMIN — LIDOCAINE HYDROCHLORIDE,EPINEPHRINE BITARTRATE 10 ML: 10; .01 INJECTION, SOLUTION INFILTRATION; PERINEURAL at 22:10

## 2019-07-09 RX ADMIN — TETANUS TOXOID, REDUCED DIPHTHERIA TOXOID AND ACELLULAR PERTUSSIS VACCINE, ADSORBED 0.5 ML: 5; 2.5; 8; 8; 2.5 SUSPENSION INTRAMUSCULAR at 22:08

## 2019-07-10 NOTE — ED PROVIDER NOTES
" EMERGENCY DEPARTMENT ENCOUNTER    CHIEF COMPLAINT  Chief Complaint: fall  History given by: patient  History limited by: none  Room Number: 13/13  PMD: Harley Barney MD      HPI:  Pt is a 89 y.o. female who presents complaining of fall that happened earlier tonight when the pt tripped over a table while she was walking in her home. Pt hit head head when she fell, but \"did not think anything of it\" until she noticed blood coming from her head a short while later. Pt now c/o head laceration. Pt denies LOC and neck pain. Pt is not on blood thinners.     Duration: fall happened earlier tonight  Onset: sudden  Timing: brief  Location: head  Radiation: n/a  Quality: fall  Intensity/Severity: moderate  Progression: resolved  Associated Symptoms: head laceration  Aggravating Factors: none  Alleviating Factors: none  Previous Episodes: none  Treatment before arrival: Pt is not on blood thinners.     PAST MEDICAL HISTORY  Active Ambulatory Problems     Diagnosis Date Noted   • Osteoporosis 05/03/2016   • Colitis 06/10/2017     Resolved Ambulatory Problems     Diagnosis Date Noted   • No Resolved Ambulatory Problems     Past Medical History:   Diagnosis Date   • Anemia    • Colitis    • History of irregular heartbeat    • Hypertension    • Lower GI bleed    • Macular degeneration    • Osteoporosis    • Osteoporosis    • PNA (pneumonia)    • Urinary tract infection        PAST SURGICAL HISTORY  Past Surgical History:   Procedure Laterality Date   • APPENDECTOMY     • SKIN CANCER EXCISION         FAMILY HISTORY  History reviewed. No pertinent family history.    SOCIAL HISTORY  Social History     Socioeconomic History   • Marital status: Single     Spouse name: Not on file   • Number of children: Not on file   • Years of education: Not on file   • Highest education level: Not on file   Tobacco Use   • Smoking status: Former Smoker   • Smokeless tobacco: Never Used   • Tobacco comment: quit 25 years ago   Substance and Sexual " Activity   • Alcohol use: Yes     Alcohol/week: 4.2 oz     Types: 7 Glasses of wine per week     Comment: nighty   • Drug use: No   • Sexual activity: Defer       ALLERGIES  Patient has no known allergies.    REVIEW OF SYSTEMS  Review of Systems   Constitutional: Negative for fever.   HENT:        Pt c/o head laceration.   Respiratory: Negative for shortness of breath.    Cardiovascular: Negative for chest pain.   Musculoskeletal: Negative for neck pain.   Neurological: Negative for syncope.   All other ROS are otherwise negative unless stated above.     PHYSICAL EXAM  ED Triage Vitals   Temp Heart Rate Resp BP SpO2   07/09/19 2121 07/09/19 2121 07/09/19 2121 07/09/19 2130 07/09/19 2121   98.5 °F (36.9 °C) 90 18 (!) 181/100 96 %      Temp src Heart Rate Source Patient Position BP Location FiO2 (%)   07/09/19 2121 -- -- -- --   Oral           Physical Exam   Constitutional: She is oriented to person, place, and time. No distress.   HENT:   There is a 3 cm laceration to the pt's posterior L parietal region. Head is otherwise atraumatic.    Eyes: EOM are normal.   Neck: Normal range of motion.   Cardiovascular: Normal rate and regular rhythm.   Pulmonary/Chest: Effort normal and breath sounds normal. No respiratory distress.   Musculoskeletal:        Cervical back: She exhibits no tenderness.   Neurological: She is alert and oriented to person, place, and time. She has normal sensation and normal strength.   Skin: Skin is warm and dry.   Psychiatric: Affect normal.   Nursing note and vitals reviewed.        RADIOLOGY  CT Head Without Contrast   Final Result   1. No acute intracranial abnormality.                       This report was finalized on 7/9/2019 10:10 PM by Nahid Ovalle M.D.               I ordered the above noted radiological studies. Interpreted by radiologist. Reviewed by me in PACS.       PROCEDURES  Laceration Repair  Date/Time: 7/9/2019 10:47 PM  Performed by: Montez Nichols MD  Authorized by:  Montez Nichols MD     Consent:     Consent obtained:  Verbal    Consent given by:  Patient  Anesthesia (see MAR for exact dosages):     Anesthesia method:  Local infiltration    Local anesthetic:  Lidocaine 1% WITH epi  Laceration details:     Location:  Scalp    Scalp location:  L parietal    Length (cm):  3  Repair type:     Repair type:  Simple  Pre-procedure details:     Preparation:  Patient was prepped and draped in usual sterile fashion  Treatment:     Area cleansed with:  Saline    Amount of cleaning:  Standard    Irrigation solution:  Sterile saline  Skin repair:     Repair method:  Staples    Number of staples:  6  Approximation:     Approximation:  Close  Post-procedure details:     Patient tolerance of procedure:  Tolerated well, no immediate complications            PROGRESS AND CONSULTS     2124 Tdap ordered for pt's laceration. CT Head ordered for further evaluation.     2246 Rechecked pt. Pt is resting comfortably. Notified pt of negative Head CT. Discussed the plan to repair the pt's laceration. Laceration repair procedure performed. Pt tolerated the procedure well with no immediate complications. Discussed the plan to discharge the pt home. I instructed the pt to follow up with her PCP in one week for suture removal. Pt understands and agrees with the plan, all questions answered.      MEDICAL DECISION MAKING  Results were reviewed/discussed with the patient and they were also made aware of online access. Pt also made aware that some labs, such as cultures, will not be resulted during ER visit and follow up with PMD is necessary.     MDM  Number of Diagnoses or Management Options  Laceration of scalp, initial encounter:      Amount and/or Complexity of Data Reviewed  Tests in the radiology section of CPT®: ordered and reviewed (CT Head - negative acute)  Independent visualization of images, tracings, or specimens: yes           DIAGNOSIS  Final diagnoses:   Laceration of scalp, initial encounter        DISPOSITION  DISCHARGE    Patient discharged in stable condition.    Reviewed implications of results, diagnosis, meds, responsibility to follow up, warning signs and symptoms of possible worsening, potential complications and reasons to return to ER.    Patient/Family voiced understanding of above instructions.    Discussed plan for discharge, as there is no emergent indication for admission. Patient referred to primary care provider for BP management due to today's BP. Pt/family is agreeable and understands need for follow up and repeat testing.  Pt is aware that discharge does not mean that nothing is wrong but it indicates no emergency is present that requires admission and they must continue care with follow-up as given below or physician of their choice.     FOLLOW-UP  Harley Barney MD  [address] [id number] [phone]    In 1 week  For suture removal         Medication List      Changed    lisinopril 10 MG tablet  Commonly known as:  PRINIVIL,ZESTRIL  Take 2 tablets by mouth daily.  What changed:  how much to take              Latest Documented Vital Signs:  As of 12:14 AM  BP- (!) 193/99 HR- 69 Temp- 98.5 °F (36.9 °C) (Oral) O2 sat- 96%    --  Documentation assistance provided by salud Swartz for Dr. Nichols.  Information recorded by the scribe was done at my direction and has been verified and validated by me.     Dalton Swartz  07/09/19 [id number]       Montez Nichols MD  07/10/19 0014

## 2019-07-10 NOTE — ED NOTES
Pt states that she was talking on her phone while walking when she tripped over at table and fell. Pt states that she went on to the kitchen and noticed blood. Denies loc, denies pain. Eyes perrla. Pt has laceration to left posterior head. Bleeding controlled     Jessica Brand, RN  07/09/19 0113

## 2019-11-19 ENCOUNTER — INFUSION (OUTPATIENT)
Dept: ONCOLOGY | Facility: HOSPITAL | Age: 84
End: 2019-11-19

## 2019-11-19 VITALS
WEIGHT: 117 LBS | HEART RATE: 81 BPM | SYSTOLIC BLOOD PRESSURE: 168 MMHG | OXYGEN SATURATION: 98 % | BODY MASS INDEX: 20.4 KG/M2 | DIASTOLIC BLOOD PRESSURE: 92 MMHG

## 2019-11-19 DIAGNOSIS — M81.0 OSTEOPOROSIS, UNSPECIFIED OSTEOPOROSIS TYPE, UNSPECIFIED PATHOLOGICAL FRACTURE PRESENCE: Primary | ICD-10-CM

## 2019-11-19 PROCEDURE — 25010000002 DENOSUMAB 60 MG/ML SOLUTION PREFILLED SYRINGE: Performed by: OBSTETRICS & GYNECOLOGY

## 2019-11-19 PROCEDURE — 96372 THER/PROPH/DIAG INJ SC/IM: CPT | Performed by: NURSE PRACTITIONER

## 2019-11-19 RX ADMIN — DENOSUMAB 60 MG: 60 INJECTION SUBCUTANEOUS at 16:00

## 2019-11-19 NOTE — PROGRESS NOTES
Arrived ambulatory for prolia injection. Indication and side effects reviewed. Denies recent dental work. Labs and medications verified. Prolia administered in right arm without incidence. Instructed to call prescribing MD for any concerns or questions and instructed on how to schedule future appts.  Pt vu and discharged ambulatory.

## 2020-02-10 ENCOUNTER — APPOINTMENT (OUTPATIENT)
Dept: CT IMAGING | Facility: HOSPITAL | Age: 85
End: 2020-02-10

## 2020-02-10 ENCOUNTER — HOSPITAL ENCOUNTER (EMERGENCY)
Facility: HOSPITAL | Age: 85
Discharge: HOME | End: 2020-02-10
Attending: EMERGENCY MEDICINE | Admitting: EMERGENCY MEDICINE

## 2020-02-10 VITALS
RESPIRATION RATE: 18 BRPM | SYSTOLIC BLOOD PRESSURE: 184 MMHG | BODY MASS INDEX: 19.46 KG/M2 | WEIGHT: 114 LBS | TEMPERATURE: 97.1 F | OXYGEN SATURATION: 98 % | DIASTOLIC BLOOD PRESSURE: 88 MMHG | HEIGHT: 64 IN | HEART RATE: 67 BPM
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
## 2020-02-10 DIAGNOSIS — S01.01XA LACERATION OF SCALP, INITIAL ENCOUNTER: ICD-10-CM

## 2020-02-10 PROCEDURE — 99283 EMERGENCY DEPT VISIT LOW MDM: CPT

## 2020-02-10 PROCEDURE — 70450 CT HEAD/BRAIN W/O DYE: CPT

## 2020-02-10 RX ORDER — LIDOCAINE HYDROCHLORIDE AND EPINEPHRINE 10; 10 MG/ML; UG/ML
10 INJECTION, SOLUTION INFILTRATION; PERINEURAL ONCE
Status: COMPLETED | OUTPATIENT
Start: 2020-02-10 | End: 2020-02-10

## 2020-02-10 RX ORDER — LOSARTAN POTASSIUM 100 MG/1
TABLET ORAL
COMMUNITY

## 2020-02-10 RX ORDER — DIAZEPAM 2 MG/1
TABLET ORAL
COMMUNITY
End: 2022-05-29

## 2020-02-10 RX ORDER — ZOLPIDEM TARTRATE 5 MG/1
TABLET ORAL
COMMUNITY
End: 2021-01-21

## 2020-02-10 RX ADMIN — LIDOCAINE HYDROCHLORIDE,EPINEPHRINE BITARTRATE 10 ML: 10; .01 INJECTION, SOLUTION INFILTRATION; PERINEURAL at 21:16

## 2020-02-11 NOTE — ED PROVIDER NOTES
EMERGENCY DEPARTMENT ENCOUNTER    CHIEF COMPLAINT  Chief Complaint: Head injury  History given by: Patient  History limited by: None  Room Number: 42/42  PMD: Harley Barney MD    HPI:  Pt is a 89 y.o. female who presents with reported occipital head injury and laceration that occurred suddenly PTA when pt had a mechanical fall. She denies any LOC during the fall. She is not anticoagulated.    Duration: Occurred PTA  Onset: Sudden  Timing: Constant  Location: Occipital head  Intensity/Severity: Moderate  Progression: Unchanged  Associated Symptoms: None  Previous Episodes: None  Treatment before arrival: None, she is not anticoagulated    PAST MEDICAL HISTORY  Active Ambulatory Problems     Diagnosis Date Noted   • Osteoporosis 05/03/2016   • Colitis 06/10/2017     Resolved Ambulatory Problems     Diagnosis Date Noted   • No Resolved Ambulatory Problems     Past Medical History:   Diagnosis Date   • Anemia    • History of irregular heartbeat    • Hypertension    • Lower GI bleed    • Macular degeneration    • PNA (pneumonia)    • Urinary tract infection        PAST SURGICAL HISTORY  Past Surgical History:   Procedure Laterality Date   • APPENDECTOMY     • SKIN CANCER EXCISION         FAMILY HISTORY  History reviewed. No pertinent family history.    SOCIAL HISTORY  Social History     Socioeconomic History   • Marital status: Single     Spouse name: Not on file   • Number of children: Not on file   • Years of education: Not on file   • Highest education level: Not on file   Tobacco Use   • Smoking status: Former Smoker   • Smokeless tobacco: Never Used   • Tobacco comment: quit 25 years ago   Substance and Sexual Activity   • Alcohol use: Yes     Alcohol/week: 7.0 standard drinks     Types: 7 Glasses of wine per week     Comment: nighty   • Drug use: No   • Sexual activity: Defer       ALLERGIES  Patient has no known allergies.    REVIEW OF SYSTEMS  Review of Systems   Constitutional: Negative for fever.   HENT:  Negative for sore throat.         Positive for occipital head injury and laceration s/p fall.   Eyes: Negative.    Respiratory: Negative for cough and shortness of breath.    Cardiovascular: Negative for chest pain.   Gastrointestinal: Negative for abdominal pain, diarrhea and vomiting.   Genitourinary: Negative for dysuria.   Musculoskeletal: Negative for neck pain.   Skin: Negative for rash.   Allergic/Immunologic: Negative.    Neurological: Negative for weakness, numbness and headaches.        Negative for LOC.   Hematological: Negative.    Psychiatric/Behavioral: Negative.    All other systems reviewed and are negative.      PHYSICAL EXAM  ED Triage Vitals [02/10/20 2035]   Temp Heart Rate Resp BP SpO2   97.1 °F (36.2 °C) 67 18 -- 98 %      Temp src Heart Rate Source Patient Position BP Location FiO2 (%)   Tympanic -- -- -- --       Physical Exam   Constitutional: She is oriented to person, place, and time. No distress.   Pt is pleasant, in NAD, and atraumatic otherwise, besides her head.   HENT:   Head: Normocephalic. Head is with laceration (approx. 2 cm to the occipital scalp).   Eyes: Pupils are equal, round, and reactive to light. EOM are normal.   Neck: Normal range of motion. Neck supple.   Cardiovascular: Normal rate, regular rhythm and normal heart sounds.   Pulmonary/Chest: Effort normal and breath sounds normal. No respiratory distress.   Abdominal: Soft. There is no tenderness. There is no rebound and no guarding.   Musculoskeletal: Normal range of motion. She exhibits no edema.        Cervical back: She exhibits no tenderness.   Neurological: She is alert and oriented to person, place, and time. She has normal sensation and normal strength.   Normal neuro exam.   Skin: Skin is warm and dry. No rash noted.   Psychiatric: Mood and affect normal.   Nursing note and vitals reviewed.      RADIOLOGY  CT Head Without Contrast   Final Result   1. No acute intracranial abnormality.                       This  report was finalized on 2/10/2020 10:40 PM by Nahid Ovalle M.D.               I ordered the above noted radiological studies. Interpreted by radiologist. Reviewed by me in PACS.     PROCEDURES  Laceration Repair  Date/Time: 2/10/2020 9:53 PM  Performed by: Montez Nichols MD  Authorized by: Montez Nichols MD     Consent:     Consent obtained:  Verbal    Consent given by:  Patient    Risks discussed:  Infection and pain  Anesthesia (see MAR for exact dosages):     Anesthesia method:  Local infiltration    Local anesthetic:  Lidocaine 1% WITH epi (8 cc)  Laceration details:     Location:  Scalp    Scalp location:  Occipital    Length (cm):  2  Repair type:     Repair type:  Simple  Pre-procedure details:     Preparation:  Patient was prepped and draped in usual sterile fashion  Exploration:     Hemostasis achieved with:  Direct pressure and epinephrine    Wound exploration: wound explored through full range of motion      Wound extent: no underlying fracture noted      Contaminated: no    Treatment:     Area cleansed with:  Hibiclens    Amount of cleaning:  Standard    Irrigation solution:  Sterile saline  Skin repair:     Repair method:  Staples    Number of staples:  5  Post-procedure details:     Dressing:  Open (no dressing)    Patient tolerance of procedure:  Tolerated well, no immediate complications      PROGRESS AND CONSULTS     2043 Ordered CT head for further evaluation.    2159 Rechecked with pt, who is resting comfortably, and informed her that her CT head shows no acute intracranial abnormalities. I have repaired pt laceration at this time. Plan to discharge. She should f/u with her PCP in 7-10 days for staple removal. Pt understands and agrees with the plan, all questions answered.    MEDICAL DECISION MAKING  Results were reviewed/discussed with the patient and they were also made aware of online access. Pt also made aware that some labs, such as cultures, will not be resulted during ER visit and  follow up with PMD is necessary.     MDM  Number of Diagnoses or Management Options     Amount and/or Complexity of Data Reviewed  Tests in the radiology section of CPT®: reviewed and ordered  Decide to obtain previous medical records or to obtain history from someone other than the patient: yes    Patient Progress  Patient progress: stable         DIAGNOSIS  Final diagnoses:   Fall, initial encounter   Laceration of scalp, initial encounter       DISPOSITION  DISCHARGE    Patient discharged in stable condition.    Reviewed implications of results, diagnosis, meds, responsibility to follow up, warning signs and symptoms of possible worsening, potential complications and reasons to return to ER, including new or worsening sxs.    Patient/Family voiced understanding of above instructions.    Discussed plan for discharge, as there is no emergent indication for admission. Patient referred to primary care provider for BP management due to today's BP. Pt/family is agreeable and understands need for follow up and repeat testing.  Pt is aware that discharge does not mean that nothing is wrong but it indicates no emergency is present that requires admission and they must continue care with follow-up as given below or physician of their choice.     FOLLOW-UP  Harley Barney MD  [address] [address]  [phone]    In 1 week  For suture removal         Medication List      Changed    lisinopril 10 MG tablet  Commonly known as:  PRINIVIL,ZESTRIL  Take 2 tablets by mouth daily.  What changed:  how much to take        Latest Documented Vital Signs:  As of 11:15 PM  BP- (!) 184/88 HR- 67 Temp- 97.1 °F (36.2 °C) (Tympanic) O2 sat- 98%    --  Documentation assistance provided by salud Nichols MD for Dr. Nichols.  Information recorded by the salud was done at my direction and has been verified and validated by me.     Clary Bah  02/10/20 6815       Montez Nichols MD  02/10/20 0693

## 2020-02-11 NOTE — ED TRIAGE NOTES
Pt was preparing to sit in her living room and missed the chair causing her head to go back and hit the chair.  Denies LOC, small bleeding that is controlled now.  No hx of blood thinners.

## 2020-07-06 ENCOUNTER — TRANSCRIBE ORDERS (OUTPATIENT)
Dept: ADMINISTRATIVE | Facility: HOSPITAL | Age: 85
End: 2020-07-06

## 2020-07-06 DIAGNOSIS — M81.0 SENILE OSTEOPOROSIS: Primary | ICD-10-CM

## 2020-07-14 ENCOUNTER — INFUSION (OUTPATIENT)
Dept: ONCOLOGY | Facility: HOSPITAL | Age: 85
End: 2020-07-14

## 2020-07-14 VITALS
WEIGHT: 112.8 LBS | RESPIRATION RATE: 18 BRPM | OXYGEN SATURATION: 98 % | HEART RATE: 60 BPM | TEMPERATURE: 97.3 F | BODY MASS INDEX: 18.8 KG/M2 | DIASTOLIC BLOOD PRESSURE: 79 MMHG | SYSTOLIC BLOOD PRESSURE: 170 MMHG | HEIGHT: 65 IN

## 2020-07-14 DIAGNOSIS — M81.0 OSTEOPOROSIS, UNSPECIFIED OSTEOPOROSIS TYPE, UNSPECIFIED PATHOLOGICAL FRACTURE PRESENCE: Primary | ICD-10-CM

## 2020-07-14 PROCEDURE — 96372 THER/PROPH/DIAG INJ SC/IM: CPT

## 2020-07-14 PROCEDURE — 25010000002 DENOSUMAB 60 MG/ML SOLUTION PREFILLED SYRINGE: Performed by: OBSTETRICS & GYNECOLOGY

## 2020-07-14 RX ADMIN — DENOSUMAB 60 MG: 60 INJECTION SUBCUTANEOUS at 13:56

## 2021-01-12 ENCOUNTER — TRANSCRIBE ORDERS (OUTPATIENT)
Dept: ADMINISTRATIVE | Facility: HOSPITAL | Age: 86
End: 2021-01-12

## 2021-01-12 DIAGNOSIS — M81.0 SENILE OSTEOPOROSIS: Primary | ICD-10-CM

## 2021-01-21 ENCOUNTER — INFUSION (OUTPATIENT)
Dept: ONCOLOGY | Facility: HOSPITAL | Age: 86
End: 2021-01-21

## 2021-01-21 ENCOUNTER — LAB (OUTPATIENT)
Dept: OTHER | Facility: HOSPITAL | Age: 86
End: 2021-01-21

## 2021-01-21 VITALS — BODY MASS INDEX: 18.83 KG/M2 | WEIGHT: 111.4 LBS

## 2021-01-21 DIAGNOSIS — M81.0 OSTEOPOROSIS, UNSPECIFIED OSTEOPOROSIS TYPE, UNSPECIFIED PATHOLOGICAL FRACTURE PRESENCE: Primary | ICD-10-CM

## 2021-01-21 PROCEDURE — 96372 THER/PROPH/DIAG INJ SC/IM: CPT

## 2021-01-21 PROCEDURE — 83735 ASSAY OF MAGNESIUM: CPT | Performed by: OBSTETRICS & GYNECOLOGY

## 2021-01-21 PROCEDURE — 80053 COMPREHEN METABOLIC PANEL: CPT | Performed by: OBSTETRICS & GYNECOLOGY

## 2021-01-21 PROCEDURE — 84100 ASSAY OF PHOSPHORUS: CPT | Performed by: OBSTETRICS & GYNECOLOGY

## 2021-01-21 PROCEDURE — 25010000002 DENOSUMAB 60 MG/ML SOLUTION PREFILLED SYRINGE: Performed by: OBSTETRICS & GYNECOLOGY

## 2021-01-21 RX ADMIN — DENOSUMAB 60 MG: 60 INJECTION SUBCUTANEOUS at 13:15

## 2021-06-02 ENCOUNTER — TRANSCRIBE ORDERS (OUTPATIENT)
Dept: ADMINISTRATIVE | Facility: HOSPITAL | Age: 86
End: 2021-06-02

## 2021-06-02 DIAGNOSIS — M81.0 SENILE OSTEOPOROSIS: Primary | ICD-10-CM

## 2021-07-27 ENCOUNTER — INFUSION (OUTPATIENT)
Dept: ONCOLOGY | Facility: HOSPITAL | Age: 86
End: 2021-07-27

## 2021-07-27 ENCOUNTER — LAB (OUTPATIENT)
Dept: OTHER | Facility: HOSPITAL | Age: 86
End: 2021-07-27

## 2021-07-27 DIAGNOSIS — M81.0 OSTEOPOROSIS, UNSPECIFIED OSTEOPOROSIS TYPE, UNSPECIFIED PATHOLOGICAL FRACTURE PRESENCE: Primary | ICD-10-CM

## 2021-07-27 PROCEDURE — 80053 COMPREHEN METABOLIC PANEL: CPT | Performed by: OBSTETRICS & GYNECOLOGY

## 2021-07-27 PROCEDURE — 96372 THER/PROPH/DIAG INJ SC/IM: CPT

## 2021-07-27 PROCEDURE — 25010000002 DENOSUMAB 60 MG/ML SOLUTION PREFILLED SYRINGE: Performed by: OBSTETRICS & GYNECOLOGY

## 2021-07-27 PROCEDURE — 84100 ASSAY OF PHOSPHORUS: CPT | Performed by: OBSTETRICS & GYNECOLOGY

## 2021-07-27 PROCEDURE — 83735 ASSAY OF MAGNESIUM: CPT | Performed by: OBSTETRICS & GYNECOLOGY

## 2021-07-27 RX ADMIN — DENOSUMAB 60 MG: 60 INJECTION SUBCUTANEOUS at 14:58

## 2021-11-01 ENCOUNTER — HOSPITAL ENCOUNTER (EMERGENCY)
Facility: HOSPITAL | Age: 86
Discharge: HOME | End: 2021-11-01
Attending: EMERGENCY MEDICINE | Admitting: EMERGENCY MEDICINE

## 2021-11-01 VITALS
RESPIRATION RATE: 16 BRPM | SYSTOLIC BLOOD PRESSURE: 169 MMHG | HEIGHT: 64 IN | OXYGEN SATURATION: 98 % | HEART RATE: 52 BPM | BODY MASS INDEX: 17.75 KG/M2 | DIASTOLIC BLOOD PRESSURE: 82 MMHG | TEMPERATURE: 97.8 F | WEIGHT: 104 LBS

## 2021-11-01 DIAGNOSIS — S51.011A SKIN TEAR OF RIGHT ELBOW WITHOUT COMPLICATION, INITIAL ENCOUNTER: ICD-10-CM

## 2021-11-01 DIAGNOSIS — S81.011A KNEE LACERATION, RIGHT, INITIAL ENCOUNTER: ICD-10-CM
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
## 2021-11-01 PROCEDURE — 99282 EMERGENCY DEPT VISIT SF MDM: CPT

## 2021-11-01 RX ORDER — LIDOCAINE HYDROCHLORIDE AND EPINEPHRINE 10; 10 MG/ML; UG/ML
10 INJECTION, SOLUTION INFILTRATION; PERINEURAL ONCE
Status: COMPLETED | OUTPATIENT
Start: 2021-11-01 | End: 2021-11-01

## 2021-11-01 RX ADMIN — LIDOCAINE-EPINEPHRINE-TETRACAINE GEL 4-0.05-0.5% 3 ML: 4-0.05-0.5 GEL at 11:54

## 2021-11-01 RX ADMIN — LIDOCAINE HYDROCHLORIDE,EPINEPHRINE BITARTRATE 10 ML: 10; .01 INJECTION, SOLUTION INFILTRATION; PERINEURAL at 11:55

## 2021-11-01 NOTE — DISCHARGE INSTRUCTIONS
Keep ace wrap on knee to help you from flexing too much.  Flexing too much will cause sutures to rip open    Sutures will dissolve on their own    Do not apply any soaps or lotions to the area    Return Precautions    Although you are being discharged from the ED today, I encourage you to return for worsening symptoms.  Things can, and do, change such that treatment at home with medication may not be adequate.      Specifically, return for any of the following:    Chest pain, shortness of breath, pain or nausea and vomiting not controlled by medications provided.    Please make a follow up with your Primary Care Provider for a blood pressure recheck.

## 2021-11-01 NOTE — ED TRIAGE NOTES
Pt tripped and fell in the parking lot pta.  C/o right knee and elbow pain.  Denies dizziness. No blood thinners    Patient was placed in face mask during first look triage.  Patient was wearing a face mask throughout encounter.  I wore personal protective equipment throughout the encounter.  Hand hygiene was performed before and after patient encounter.

## 2021-11-01 NOTE — ED PROVIDER NOTES
Pt presents to the ED c/o  right elbow and right knee injuries that she tripped and fell going into her doctor's office today.  Reports skin tears to both areas.  Denies any other injuries or concerns at this time.     On exam,   General: No acute distress, non-toxic  HEENT: EOMI  Pulm: Symmetric chest rise, nonlabored breathing  CV: Regular rate and rhythm  GI: Non-distended  MSK: No deformity  Skin: Warm, dry, superficial skin tear at the olecranon of the right elbow, skin tear/laceration of the anterior aspect of the right knee without significant bleeding.  Range of motion intact.  Neuro: Awake, alert, oriented x 4, moving all extremities, no focal deficits  Psych: Calm, cooperative    Vital signs and nursing notes reviewed.       N95, protective eye goggles, and gloves used during this encounter. Patient in surgical mask.      Plan: Patient ambulatory and with full range of motion of her extremities.  No plans for any imaging at this time.  Wounds seem to be superficial in nature.  Plan for wound cleaning and repair, outpatient follow-up.  ED return for worsening symptoms as needed.       Attestation:  The JEFERSON and I have discussed this patient's history, physical exam, and treatment plan.  I have reviewed the documentation and personally had a face to face interaction with the patient. I affirm the documentation and agree with the treatment and plan.  The attached note describes my personal findings.            Juan Diego Wakefield MD  11/01/21 1522

## 2021-11-01 NOTE — ED PROVIDER NOTES
EMERGENCY DEPARTMENT ENCOUNTER    Room Number:  02/02  Date seen:  11/1/2021  Time seen: 11:32 EDT  PCP: Harley Barney MD  Historian: patient    HPI:  Chief complaint:right elbow pain, right knee laceration  A complete HPI/ROS/PMH/PSH/SH/FH are unobtainable due to: n/a  Context:Liudmila Aguayo is a 89 y.o. female who presents to the ED with c/o mechanical trip and fall when she was on her way to see Dr. Barney for her yearly appointment.  She has mild right elbow skin tear and moderate right knee laceration.  She did not hit her head.  She denies any preceding symptoms.  She wants to get out of her quickly to make her 2:00 pm bridge game.  She has no other complaints.  Dr. Barney's office applied some tegaderms to the areas. Her Tdap is current last received in 2019.      Patient was placed in face mask in first look. Patient was wearing facemask when I entered the room and throughout our encounter. I wore full protective equipment throughout this patient encounter including a N 95 face mask, eye shield and gloves. Hand hygiene/washing of hands was performed before donning protective equipment and after removal when leaving the room.    MEDICAL RECORD REVIEW    ALLERGIES  Patient has no known allergies.    PAST MEDICAL HISTORY  Active Ambulatory Problems     Diagnosis Date Noted   • Osteoporosis 05/03/2016   • Colitis 06/10/2017     Resolved Ambulatory Problems     Diagnosis Date Noted   • No Resolved Ambulatory Problems     Past Medical History:   Diagnosis Date   • Anemia    • History of irregular heartbeat    • Hypertension    • Lower GI bleed    • Macular degeneration    • PNA (pneumonia)    • Urinary tract infection        PAST SURGICAL HISTORY  Past Surgical History:   Procedure Laterality Date   • APPENDECTOMY     • SKIN CANCER EXCISION         FAMILY HISTORY  No family history on file.    SOCIAL HISTORY  Social History     Socioeconomic History   • Marital status:    Tobacco Use   • Smoking status:  Former Smoker   • Smokeless tobacco: Never Used   • Tobacco comment: quit 25 years ago   Substance and Sexual Activity   • Alcohol use: Yes     Alcohol/week: 7.0 standard drinks     Types: 7 Glasses of wine per week     Comment: nighty   • Drug use: No   • Sexual activity: Defer       REVIEW OF SYSTEMS  Review of Systems    All systems reviewed and negative except for those discussed in HPI.     PHYSICAL EXAM    ED Triage Vitals   Temp Heart Rate Resp BP SpO2   11/01/21 1105 11/01/21 1105 11/01/21 1105 11/01/21 1129 11/01/21 1105   97.8 °F (36.6 °C) 56 16 169/82 98 %      Temp src Heart Rate Source Patient Position BP Location FiO2 (%)   11/01/21 1105 11/01/21 1105 11/01/21 1129 11/01/21 1129 --   Tympanic Monitor Lying Right arm      Physical Exam    I have reviewed the triage vital signs and nursing notes.      GENERAL: not distressed  HENT: nares patent  EYES: no scleral icterus  NECK: no ROM limitations  CV: regular rhythm, regular rate, no murmur, no rubs, no gallups  RESPIRATORY: normal effort, CTAB  ABDOMEN: soft  : deferred  MUSCULOSKELETAL: 6 cm curved laceration to the superior right knee.  There is 3 cm skin tear to right elbow.   NEURO: alert, moves all extremities, follows commands  SKIN: warm, dry    Laceration Repair    Date/Time: 11/1/2021 3:59 PM  Performed by: Mary Jane Padilla APRN  Authorized by: Juan Diego Wakefield MD     Consent:     Consent obtained:  Verbal    Consent given by:  Patient    Risks discussed:  Infection, pain, poor cosmetic result and poor wound healing    Alternatives discussed:  No treatment  Anesthesia (see MAR for exact dosages):     Anesthesia method:  Topical application and local infiltration    Topical anesthetic:  LET    Local anesthetic:  Lidocaine 1% WITH epi  Laceration details:     Location:  Leg    Leg location:  R knee  Repair type:     Repair type:  Simple  Exploration:     Wound exploration: wound explored through full range of motion and entire depth of wound  probed and visualized      Wound extent: no foreign bodies/material noted, no muscle damage noted, no tendon damage noted and no underlying fracture noted      Contaminated: no    Treatment:     Area cleansed with:  Raul-Megan    Amount of cleaning:  Extensive    Irrigation solution:  Sterile saline    Irrigation volume:  30    Irrigation method:  Pressure wash    Visualized foreign bodies/material removed: no    Skin repair:     Repair method:  Sutures    Suture size:  6-0    Suture material:  Chromic gut    Suture technique:  Running  Approximation:     Approximation:  Close  Post-procedure details:     Dressing:  Bulky dressing    Patient tolerance of procedure:  Tolerated well, no immediate complications  Comments:      Ace wrap            PROGRESS, DATA ANALYSIS, CONSULTS AND MEDICAL DECISION MAKING  All labs have been independently reviewed by me.  All radiology studies have been reviewed by me and discussed with radiologist dictating the report.  EKG's independently viewed and interpreted by me unless stated otherwise. Discussion below represents my analysis of pertinent findings related to patient's condition, differential diagnosis, treatment plan and final disposition.        DDX: trip and fall, right elbow skin tear, right knee laceration, ? tdap status    MDM: The right knee laceration was repaired with chromic per procedure note.  The patient's tetanus is up-to-date.  She has no problems ambulating.  She will follow-up with Dr. Cano for a wound check.     Reviewed pt's history and workup with Dr. Wakefield.  After a bedside evaluation, Dr. Wakefield agrees with the plan of care.    The patient's history, physical exam, and lab findings were discussed with the physician, who also performed a face to face history and physical exam.  I discussed all results and noted any abnormalities with patient.  Discussed absoute need to recheck abnormalities with their family physician.  I answered any of the patient's  "questions.  Discussed plan for discharge, as there is no emergent indication for admission.  Pt is agreeable and understands need for follow up and repeat testing.  Pt is aware that discharge does not mean that nothing is wrong but it indicates no emergency is present and they must continue care with their family physician.  Pt is discharged with instructions to follow up with primary care doctor to have their blood pressure rechecked.         Disposition vitals:  /82 (BP Location: Right arm, Patient Position: Lying)   Pulse 52   Temp 97.8 °F (36.6 °C) (Tympanic)   Resp 16   Ht 162.6 cm (64\")   Wt 47.2 kg (104 lb)   SpO2 98%   BMI 17.85 kg/m²       DIAGNOSIS  Final diagnoses:   Slipping, tripping and stumbling without falling due to stepping from one level to another, initial encounter   Knee laceration, right, initial encounter   Skin tear of right elbow without complication, initial encounter       FOLLOW UP   Harley Barney MD  [address] [address]  [phone]    Schedule an appointment as soon as possible for a visit in 1 week  As needed, For wound re-check         Mary Jane Padilla, TAVO  11/01/21 1601       Mary Jane Padilla, TAVO  11/01/21 1605    "

## 2021-11-15 ENCOUNTER — HOSPITAL ENCOUNTER (OUTPATIENT)
Dept: GENERAL RADIOLOGY | Facility: HOSPITAL | Age: 86
Discharge: HOME | End: 2021-11-15

## 2021-11-15 DIAGNOSIS — M54.50 LOW BACK PAIN, UNSPECIFIED BACK PAIN LATERALITY, UNSPECIFIED CHRONICITY, UNSPECIFIED WHETHER SCIATICA PRESENT: ICD-10-CM

## 2021-11-15 PROCEDURE — 72072 X-RAY EXAM THORAC SPINE 3VWS: CPT

## 2021-11-15 PROCEDURE — 72110 X-RAY EXAM L-2 SPINE 4/>VWS: CPT

## 2022-01-27 ENCOUNTER — TRANSCRIBE ORDERS (OUTPATIENT)
Dept: ADMINISTRATIVE | Facility: HOSPITAL | Age: 87
End: 2022-01-27

## 2022-01-27 DIAGNOSIS — M81.0 OSTEOPOROSIS, UNSPECIFIED OSTEOPOROSIS TYPE, UNSPECIFIED PATHOLOGICAL FRACTURE PRESENCE: Primary | ICD-10-CM

## 2022-02-01 DIAGNOSIS — M81.0 OSTEOPOROSIS, UNSPECIFIED OSTEOPOROSIS TYPE, UNSPECIFIED PATHOLOGICAL FRACTURE PRESENCE: Primary | ICD-10-CM

## 2022-02-02 ENCOUNTER — INFUSION (OUTPATIENT)
Dept: ONCOLOGY | Facility: HOSPITAL | Age: 87
End: 2022-02-02

## 2022-02-02 ENCOUNTER — LAB (OUTPATIENT)
Dept: OTHER | Facility: HOSPITAL | Age: 87
End: 2022-02-02

## 2022-02-02 VITALS — HEIGHT: 64 IN | TEMPERATURE: 98 F | RESPIRATION RATE: 18 BRPM | BODY MASS INDEX: 18.13 KG/M2

## 2022-02-02 DIAGNOSIS — M81.0 OSTEOPOROSIS, UNSPECIFIED OSTEOPOROSIS TYPE, UNSPECIFIED PATHOLOGICAL FRACTURE PRESENCE: Primary | ICD-10-CM

## 2022-02-02 DIAGNOSIS — M81.0 OSTEOPOROSIS, UNSPECIFIED OSTEOPOROSIS TYPE, UNSPECIFIED PATHOLOGICAL FRACTURE PRESENCE: ICD-10-CM

## 2022-02-02 LAB
25(OH)D3 SERPL-MCNC: 46.8 NG/ML (ref 30–100)
ALBUMIN SERPL-MCNC: 4.1 G/DL (ref 3.5–5.2)
ALBUMIN/GLOB SERPL: 1.4 G/DL
ALP SERPL-CCNC: 65 U/L (ref 39–117)
ALT SERPL W P-5'-P-CCNC: 9 U/L (ref 1–33)
ANION GAP SERPL CALCULATED.3IONS-SCNC: 10.2 MMOL/L (ref 5–15)
AST SERPL-CCNC: 19 U/L (ref 1–32)
BILIRUB SERPL-MCNC: 0.3 MG/DL (ref 0–1.2)
BUN SERPL-MCNC: 31 MG/DL (ref 8–23)
BUN/CREAT SERPL: 22.6 (ref 7–25)
CALCIUM SPEC-SCNC: 9.5 MG/DL (ref 8.2–9.6)
CHLORIDE SERPL-SCNC: 101 MMOL/L (ref 98–107)
CO2 SERPL-SCNC: 25.8 MMOL/L (ref 22–29)
CREAT SERPL-MCNC: 1.37 MG/DL (ref 0.57–1)
GFR SERPL CREATININE-BSD FRML MDRD: 36 ML/MIN/1.73
GLOBULIN UR ELPH-MCNC: 2.9 GM/DL
GLUCOSE SERPL-MCNC: 97 MG/DL (ref 65–99)
POTASSIUM SERPL-SCNC: 4.6 MMOL/L (ref 3.5–5.2)
PROT SERPL-MCNC: 7 G/DL (ref 6–8.5)
SODIUM SERPL-SCNC: 137 MMOL/L (ref 136–145)

## 2022-02-02 PROCEDURE — 80053 COMPREHEN METABOLIC PANEL: CPT | Performed by: OBSTETRICS & GYNECOLOGY

## 2022-02-02 PROCEDURE — 96372 THER/PROPH/DIAG INJ SC/IM: CPT

## 2022-02-02 PROCEDURE — 82306 VITAMIN D 25 HYDROXY: CPT | Performed by: OBSTETRICS & GYNECOLOGY

## 2022-02-02 PROCEDURE — 25010000002 DENOSUMAB 60 MG/ML SOLUTION PREFILLED SYRINGE: Performed by: OBSTETRICS & GYNECOLOGY

## 2022-02-02 RX ADMIN — DENOSUMAB 60 MG: 60 INJECTION SUBCUTANEOUS at 15:03

## 2022-02-02 NOTE — NURSING NOTE
Arrived for prolia injection. Indication and side effects reviewed. Denies recent dental work. Labs and medications verified. Prolia administered in left arm without incidence. Instructed to call prescribing MD for any concerns or questions and instructed on how to schedule future appts.  Pt vu and discharged ambulatory.

## 2022-05-29 ENCOUNTER — HOSPITAL ENCOUNTER (OUTPATIENT)
Facility: HOSPITAL | Age: 87
Setting detail: OBSERVATION
LOS: 1 days | Discharge: NURSING, REHAB OR LONG-TERM CARE | End: 2022-06-03
Attending: EMERGENCY MEDICINE | Admitting: INTERNAL MEDICINE

## 2022-05-29 ENCOUNTER — APPOINTMENT (OUTPATIENT)
Dept: GENERAL RADIOLOGY | Facility: HOSPITAL | Age: 87
End: 2022-05-29

## 2022-05-29 DIAGNOSIS — R53.83 FATIGUE, UNSPECIFIED TYPE: ICD-10-CM

## 2022-05-29 DIAGNOSIS — R53.1 GENERAL WEAKNESS: Primary | ICD-10-CM

## 2022-05-29 DIAGNOSIS — R94.4 KIDNEY FUNCTION TEST ABNORMAL: ICD-10-CM

## 2022-05-29 LAB
ALBUMIN SERPL-MCNC: 3.8 G/DL (ref 3.5–5.2)
ALBUMIN/GLOB SERPL: 1.4 G/DL
ALP SERPL-CCNC: 83 U/L (ref 39–117)
ALT SERPL W P-5'-P-CCNC: 16 U/L (ref 1–33)
ANION GAP SERPL CALCULATED.3IONS-SCNC: 13 MMOL/L (ref 5–15)
AST SERPL-CCNC: 12 U/L (ref 1–32)
BACTERIA UR QL AUTO: ABNORMAL /HPF
BASOPHILS # BLD AUTO: 0.04 10*3/MM3 (ref 0–0.2)
BASOPHILS NFR BLD AUTO: 0.4 % (ref 0–1.5)
BILIRUB SERPL-MCNC: 0.4 MG/DL (ref 0–1.2)
BILIRUB UR QL STRIP: NEGATIVE
BUN SERPL-MCNC: 33 MG/DL (ref 8–23)
BUN/CREAT SERPL: 25.6 (ref 7–25)
CALCIUM SPEC-SCNC: 10.7 MG/DL (ref 8.2–9.6)
CHLORIDE SERPL-SCNC: 100 MMOL/L (ref 98–107)
CK SERPL-CCNC: 32 U/L (ref 20–180)
CLARITY UR: CLEAR
CO2 SERPL-SCNC: 22 MMOL/L (ref 22–29)
COLOR UR: YELLOW
CREAT SERPL-MCNC: 1.29 MG/DL (ref 0.57–1)
CRP SERPL-MCNC: 2 MG/DL (ref 0–0.5)
DEPRECATED RDW RBC AUTO: 46.1 FL (ref 37–54)
EGFRCR SERPLBLD CKD-EPI 2021: 38.5 ML/MIN/1.73
EOSINOPHIL # BLD AUTO: 0.16 10*3/MM3 (ref 0–0.4)
EOSINOPHIL NFR BLD AUTO: 1.7 % (ref 0.3–6.2)
ERYTHROCYTE [DISTWIDTH] IN BLOOD BY AUTOMATED COUNT: 13.6 % (ref 12.3–15.4)
ERYTHROCYTE [SEDIMENTATION RATE] IN BLOOD: 23 MM/HR (ref 0–30)
GLOBULIN UR ELPH-MCNC: 2.8 GM/DL
GLUCOSE SERPL-MCNC: 112 MG/DL (ref 65–99)
GLUCOSE UR STRIP-MCNC: NEGATIVE MG/DL
HCT VFR BLD AUTO: 35 % (ref 34–46.6)
HGB BLD-MCNC: 11.6 G/DL (ref 12–15.9)
HGB UR QL STRIP.AUTO: ABNORMAL
HOLD SPECIMEN: NORMAL
HOLD SPECIMEN: NORMAL
HYALINE CASTS UR QL AUTO: ABNORMAL /LPF
IMM GRANULOCYTES # BLD AUTO: 0.03 10*3/MM3 (ref 0–0.05)
IMM GRANULOCYTES NFR BLD AUTO: 0.3 % (ref 0–0.5)
KETONES UR QL STRIP: ABNORMAL
LEUKOCYTE ESTERASE UR QL STRIP.AUTO: ABNORMAL
LYMPHOCYTES # BLD AUTO: 0.67 10*3/MM3 (ref 0.7–3.1)
LYMPHOCYTES NFR BLD AUTO: 7.3 % (ref 19.6–45.3)
MAGNESIUM SERPL-MCNC: 1.4 MG/DL (ref 1.7–2.3)
MCH RBC QN AUTO: 30.6 PG (ref 26.6–33)
MCHC RBC AUTO-ENTMCNC: 33.1 G/DL (ref 31.5–35.7)
MCV RBC AUTO: 92.3 FL (ref 79–97)
MONOCYTES # BLD AUTO: 0.99 10*3/MM3 (ref 0.1–0.9)
MONOCYTES NFR BLD AUTO: 10.8 % (ref 5–12)
NEUTROPHILS NFR BLD AUTO: 7.3 10*3/MM3 (ref 1.7–7)
NEUTROPHILS NFR BLD AUTO: 79.5 % (ref 42.7–76)
NITRITE UR QL STRIP: NEGATIVE
NRBC BLD AUTO-RTO: 0 /100 WBC (ref 0–0.2)
PH UR STRIP.AUTO: <=5 [PH] (ref 5–8)
PLATELET # BLD AUTO: 280 10*3/MM3 (ref 140–450)
PMV BLD AUTO: 10.3 FL (ref 6–12)
POTASSIUM SERPL-SCNC: 3.8 MMOL/L (ref 3.5–5.2)
PROT SERPL-MCNC: 6.6 G/DL (ref 6–8.5)
PROT UR QL STRIP: ABNORMAL
PTH-INTACT SERPL-MCNC: 9.1 PG/ML (ref 15–65)
RBC # BLD AUTO: 3.79 10*6/MM3 (ref 3.77–5.28)
RBC # UR STRIP: ABNORMAL /HPF
REF LAB TEST METHOD: ABNORMAL
SARS-COV-2 RNA RESP QL NAA+PROBE: NOT DETECTED
SODIUM SERPL-SCNC: 135 MMOL/L (ref 136–145)
SP GR UR STRIP: 1.01 (ref 1–1.03)
SQUAMOUS #/AREA URNS HPF: ABNORMAL /HPF
T4 FREE SERPL-MCNC: 1.39 NG/DL (ref 0.93–1.7)
TROPONIN T SERPL-MCNC: 0.02 NG/ML (ref 0–0.03)
TSH SERPL DL<=0.05 MIU/L-ACNC: 1 UIU/ML (ref 0.27–4.2)
UROBILINOGEN UR QL STRIP: ABNORMAL
VIT B12 BLD-MCNC: 705 PG/ML (ref 211–946)
WBC # UR STRIP: ABNORMAL /HPF
WBC NRBC COR # BLD: 9.19 10*3/MM3 (ref 3.4–10.8)
WHOLE BLOOD HOLD COAG: NORMAL
WHOLE BLOOD HOLD SPECIMEN: NORMAL

## 2022-05-29 PROCEDURE — 85025 COMPLETE CBC W/AUTO DIFF WBC: CPT | Performed by: EMERGENCY MEDICINE

## 2022-05-29 PROCEDURE — G0378 HOSPITAL OBSERVATION PER HR: HCPCS

## 2022-05-29 PROCEDURE — 99284 EMERGENCY DEPT VISIT MOD MDM: CPT

## 2022-05-29 PROCEDURE — 82607 VITAMIN B-12: CPT | Performed by: INTERNAL MEDICINE

## 2022-05-29 PROCEDURE — 80053 COMPREHEN METABOLIC PANEL: CPT | Performed by: EMERGENCY MEDICINE

## 2022-05-29 PROCEDURE — U0003 INFECTIOUS AGENT DETECTION BY NUCLEIC ACID (DNA OR RNA); SEVERE ACUTE RESPIRATORY SYNDROME CORONAVIRUS 2 (SARS-COV-2) (CORONAVIRUS DISEASE [COVID-19]), AMPLIFIED PROBE TECHNIQUE, MAKING USE OF HIGH THROUGHPUT TECHNOLOGIES AS DESCRIBED BY CMS-2020-01-R: HCPCS | Performed by: PHYSICIAN ASSISTANT

## 2022-05-29 PROCEDURE — P9612 CATHETERIZE FOR URINE SPEC: HCPCS

## 2022-05-29 PROCEDURE — 84443 ASSAY THYROID STIM HORMONE: CPT | Performed by: EMERGENCY MEDICINE

## 2022-05-29 PROCEDURE — 85652 RBC SED RATE AUTOMATED: CPT | Performed by: INTERNAL MEDICINE

## 2022-05-29 PROCEDURE — 82550 ASSAY OF CK (CPK): CPT | Performed by: INTERNAL MEDICINE

## 2022-05-29 PROCEDURE — 84439 ASSAY OF FREE THYROXINE: CPT | Performed by: EMERGENCY MEDICINE

## 2022-05-29 PROCEDURE — 93010 ELECTROCARDIOGRAM REPORT: CPT | Performed by: INTERNAL MEDICINE

## 2022-05-29 PROCEDURE — 84484 ASSAY OF TROPONIN QUANT: CPT | Performed by: EMERGENCY MEDICINE

## 2022-05-29 PROCEDURE — C9803 HOPD COVID-19 SPEC COLLECT: HCPCS

## 2022-05-29 PROCEDURE — 83735 ASSAY OF MAGNESIUM: CPT | Performed by: EMERGENCY MEDICINE

## 2022-05-29 PROCEDURE — 93005 ELECTROCARDIOGRAM TRACING: CPT | Performed by: EMERGENCY MEDICINE

## 2022-05-29 PROCEDURE — 71045 X-RAY EXAM CHEST 1 VIEW: CPT

## 2022-05-29 PROCEDURE — 86140 C-REACTIVE PROTEIN: CPT | Performed by: INTERNAL MEDICINE

## 2022-05-29 PROCEDURE — 83970 ASSAY OF PARATHORMONE: CPT | Performed by: INTERNAL MEDICINE

## 2022-05-29 PROCEDURE — 81001 URINALYSIS AUTO W/SCOPE: CPT | Performed by: EMERGENCY MEDICINE

## 2022-05-29 RX ORDER — ENOXAPARIN SODIUM 100 MG/ML
30 INJECTION SUBCUTANEOUS EVERY 24 HOURS
Status: DISCONTINUED | OUTPATIENT
Start: 2022-05-30 | End: 2022-06-03 | Stop reason: HOSPADM

## 2022-05-29 RX ORDER — MELATONIN
1000 DAILY
Status: DISCONTINUED | OUTPATIENT
Start: 2022-05-30 | End: 2022-06-03 | Stop reason: HOSPADM

## 2022-05-29 RX ORDER — CALCIUM CARBONATE 500(1250)
500 TABLET ORAL 2 TIMES DAILY
Status: DISCONTINUED | OUTPATIENT
Start: 2022-05-29 | End: 2022-05-31

## 2022-05-29 RX ORDER — LOSARTAN POTASSIUM 100 MG/1
100 TABLET ORAL
Status: DISCONTINUED | OUTPATIENT
Start: 2022-05-29 | End: 2022-06-03 | Stop reason: HOSPADM

## 2022-05-29 RX ORDER — MAGNESIUM SULFATE HEPTAHYDRATE 40 MG/ML
2 INJECTION, SOLUTION INTRAVENOUS AS NEEDED
Status: DISCONTINUED | OUTPATIENT
Start: 2022-05-29 | End: 2022-06-03 | Stop reason: HOSPADM

## 2022-05-29 RX ORDER — MAGNESIUM SULFATE HEPTAHYDRATE 40 MG/ML
4 INJECTION, SOLUTION INTRAVENOUS AS NEEDED
Status: DISCONTINUED | OUTPATIENT
Start: 2022-05-29 | End: 2022-06-03 | Stop reason: HOSPADM

## 2022-05-29 RX ORDER — SODIUM CHLORIDE, SODIUM LACTATE, POTASSIUM CHLORIDE, CALCIUM CHLORIDE 600; 310; 30; 20 MG/100ML; MG/100ML; MG/100ML; MG/100ML
100 INJECTION, SOLUTION INTRAVENOUS CONTINUOUS
Status: DISCONTINUED | OUTPATIENT
Start: 2022-05-29 | End: 2022-05-30

## 2022-05-29 RX ORDER — METOPROLOL TARTRATE 50 MG/1
100 TABLET, FILM COATED ORAL DAILY
Status: DISCONTINUED | OUTPATIENT
Start: 2022-05-29 | End: 2022-06-03 | Stop reason: HOSPADM

## 2022-05-29 RX ORDER — SODIUM CHLORIDE 0.9 % (FLUSH) 0.9 %
10 SYRINGE (ML) INJECTION EVERY 12 HOURS SCHEDULED
Status: DISCONTINUED | OUTPATIENT
Start: 2022-05-29 | End: 2022-06-03 | Stop reason: HOSPADM

## 2022-05-29 RX ORDER — SODIUM CHLORIDE 0.9 % (FLUSH) 0.9 %
10 SYRINGE (ML) INJECTION AS NEEDED
Status: DISCONTINUED | OUTPATIENT
Start: 2022-05-29 | End: 2022-06-03 | Stop reason: HOSPADM

## 2022-05-29 RX ADMIN — METOPROLOL TARTRATE 100 MG: 50 TABLET, FILM COATED ORAL at 23:31

## 2022-05-29 RX ADMIN — LOSARTAN POTASSIUM 100 MG: 100 TABLET, FILM COATED ORAL at 23:05

## 2022-05-29 RX ADMIN — Medication 10 ML: at 23:07

## 2022-05-29 RX ADMIN — Medication 500 MG: at 23:05

## 2022-05-29 RX ADMIN — SODIUM CHLORIDE, POTASSIUM CHLORIDE, SODIUM LACTATE AND CALCIUM CHLORIDE 100 ML/HR: 600; 310; 30; 20 INJECTION, SOLUTION INTRAVENOUS at 23:34

## 2022-05-29 RX ADMIN — SODIUM CHLORIDE 500 ML: 9 INJECTION, SOLUTION INTRAVENOUS at 17:48

## 2022-05-30 ENCOUNTER — APPOINTMENT (OUTPATIENT)
Dept: GENERAL RADIOLOGY | Facility: HOSPITAL | Age: 87
End: 2022-05-30

## 2022-05-30 LAB
25(OH)D3 SERPL-MCNC: 45.8 NG/ML (ref 30–100)
ANION GAP SERPL CALCULATED.3IONS-SCNC: 13.2 MMOL/L (ref 5–15)
BASOPHILS # BLD AUTO: 0.05 10*3/MM3 (ref 0–0.2)
BASOPHILS NFR BLD AUTO: 0.5 % (ref 0–1.5)
BUN SERPL-MCNC: 28 MG/DL (ref 8–23)
BUN/CREAT SERPL: 27.7 (ref 7–25)
CALCIUM SPEC-SCNC: 11 MG/DL (ref 8.2–9.6)
CHLORIDE SERPL-SCNC: 101 MMOL/L (ref 98–107)
CO2 SERPL-SCNC: 24.8 MMOL/L (ref 22–29)
CREAT SERPL-MCNC: 1.01 MG/DL (ref 0.57–1)
DEPRECATED RDW RBC AUTO: 46 FL (ref 37–54)
EGFRCR SERPLBLD CKD-EPI 2021: 51.7 ML/MIN/1.73
EOSINOPHIL # BLD AUTO: 0.08 10*3/MM3 (ref 0–0.4)
EOSINOPHIL NFR BLD AUTO: 0.8 % (ref 0.3–6.2)
ERYTHROCYTE [DISTWIDTH] IN BLOOD BY AUTOMATED COUNT: 13.6 % (ref 12.3–15.4)
GLUCOSE SERPL-MCNC: 98 MG/DL (ref 65–99)
HCT VFR BLD AUTO: 32.9 % (ref 34–46.6)
HGB BLD-MCNC: 11.1 G/DL (ref 12–15.9)
IMM GRANULOCYTES # BLD AUTO: 0.03 10*3/MM3 (ref 0–0.05)
IMM GRANULOCYTES NFR BLD AUTO: 0.3 % (ref 0–0.5)
LYMPHOCYTES # BLD AUTO: 0.71 10*3/MM3 (ref 0.7–3.1)
LYMPHOCYTES NFR BLD AUTO: 7.5 % (ref 19.6–45.3)
MAGNESIUM SERPL-MCNC: 2.8 MG/DL (ref 1.7–2.3)
MCH RBC QN AUTO: 30.7 PG (ref 26.6–33)
MCHC RBC AUTO-ENTMCNC: 33.7 G/DL (ref 31.5–35.7)
MCV RBC AUTO: 91.1 FL (ref 79–97)
MONOCYTES # BLD AUTO: 1.15 10*3/MM3 (ref 0.1–0.9)
MONOCYTES NFR BLD AUTO: 12.1 % (ref 5–12)
NEUTROPHILS NFR BLD AUTO: 7.45 10*3/MM3 (ref 1.7–7)
NEUTROPHILS NFR BLD AUTO: 78.8 % (ref 42.7–76)
NRBC BLD AUTO-RTO: 0 /100 WBC (ref 0–0.2)
PHOSPHATE SERPL-MCNC: 3.4 MG/DL (ref 2.5–4.5)
PLATELET # BLD AUTO: 238 10*3/MM3 (ref 140–450)
PMV BLD AUTO: 10.3 FL (ref 6–12)
POTASSIUM SERPL-SCNC: 3.2 MMOL/L (ref 3.5–5.2)
QT INTERVAL: 398 MS
RBC # BLD AUTO: 3.61 10*6/MM3 (ref 3.77–5.28)
SODIUM SERPL-SCNC: 139 MMOL/L (ref 136–145)
WBC NRBC COR # BLD: 9.47 10*3/MM3 (ref 3.4–10.8)

## 2022-05-30 PROCEDURE — 71046 X-RAY EXAM CHEST 2 VIEWS: CPT

## 2022-05-30 PROCEDURE — G0378 HOSPITAL OBSERVATION PER HR: HCPCS

## 2022-05-30 PROCEDURE — 84100 ASSAY OF PHOSPHORUS: CPT | Performed by: INTERNAL MEDICINE

## 2022-05-30 PROCEDURE — 80048 BASIC METABOLIC PNL TOTAL CA: CPT | Performed by: INTERNAL MEDICINE

## 2022-05-30 PROCEDURE — 96361 HYDRATE IV INFUSION ADD-ON: CPT

## 2022-05-30 PROCEDURE — 85025 COMPLETE CBC W/AUTO DIFF WBC: CPT | Performed by: INTERNAL MEDICINE

## 2022-05-30 PROCEDURE — 25010000002 ENOXAPARIN PER 10 MG: Performed by: INTERNAL MEDICINE

## 2022-05-30 PROCEDURE — 82306 VITAMIN D 25 HYDROXY: CPT | Performed by: INTERNAL MEDICINE

## 2022-05-30 PROCEDURE — 25010000002 MAGNESIUM SULFATE 2 GM/50ML SOLUTION: Performed by: INTERNAL MEDICINE

## 2022-05-30 PROCEDURE — 96372 THER/PROPH/DIAG INJ SC/IM: CPT

## 2022-05-30 PROCEDURE — 83735 ASSAY OF MAGNESIUM: CPT | Performed by: INTERNAL MEDICINE

## 2022-05-30 PROCEDURE — 96366 THER/PROPH/DIAG IV INF ADDON: CPT

## 2022-05-30 PROCEDURE — 96365 THER/PROPH/DIAG IV INF INIT: CPT

## 2022-05-30 RX ORDER — POTASSIUM CHLORIDE 7.45 MG/ML
10 INJECTION INTRAVENOUS
Status: DISCONTINUED | OUTPATIENT
Start: 2022-05-30 | End: 2022-06-03 | Stop reason: HOSPADM

## 2022-05-30 RX ORDER — POTASSIUM CHLORIDE 750 MG/1
40 TABLET, FILM COATED, EXTENDED RELEASE ORAL AS NEEDED
Status: DISCONTINUED | OUTPATIENT
Start: 2022-05-30 | End: 2022-06-03 | Stop reason: HOSPADM

## 2022-05-30 RX ORDER — POTASSIUM CHLORIDE 1.5 G/1.77G
40 POWDER, FOR SOLUTION ORAL AS NEEDED
Status: DISCONTINUED | OUTPATIENT
Start: 2022-05-30 | End: 2022-06-03 | Stop reason: HOSPADM

## 2022-05-30 RX ADMIN — Medication 1000 UNITS: at 08:15

## 2022-05-30 RX ADMIN — LOSARTAN POTASSIUM 100 MG: 100 TABLET, FILM COATED ORAL at 20:49

## 2022-05-30 RX ADMIN — POTASSIUM CHLORIDE 40 MEQ: 1.5 POWDER, FOR SOLUTION ORAL at 11:56

## 2022-05-30 RX ADMIN — Medication 10 ML: at 08:15

## 2022-05-30 RX ADMIN — Medication 10 ML: at 20:50

## 2022-05-30 RX ADMIN — METOPROLOL TARTRATE 100 MG: 50 TABLET, FILM COATED ORAL at 20:49

## 2022-05-30 RX ADMIN — MAGNESIUM SULFATE HEPTAHYDRATE 2 G: 2 INJECTION, SOLUTION INTRAVENOUS at 00:42

## 2022-05-30 RX ADMIN — Medication 500 MG: at 09:10

## 2022-05-30 RX ADMIN — POTASSIUM CHLORIDE 40 MEQ: 750 TABLET, EXTENDED RELEASE ORAL at 16:24

## 2022-05-30 RX ADMIN — ENOXAPARIN SODIUM 30 MG: 30 INJECTION SUBCUTANEOUS at 08:15

## 2022-05-30 RX ADMIN — MAGNESIUM SULFATE HEPTAHYDRATE 2 G: 2 INJECTION, SOLUTION INTRAVENOUS at 02:38

## 2022-05-31 ENCOUNTER — APPOINTMENT (OUTPATIENT)
Dept: CARDIOLOGY | Facility: HOSPITAL | Age: 87
End: 2022-05-31

## 2022-05-31 ENCOUNTER — APPOINTMENT (OUTPATIENT)
Dept: CT IMAGING | Facility: HOSPITAL | Age: 87
End: 2022-05-31

## 2022-05-31 PROBLEM — E43 SEVERE MALNUTRITION (HCC): Status: ACTIVE | Noted: 2022-05-31

## 2022-05-31 LAB
ANION GAP SERPL CALCULATED.3IONS-SCNC: 11 MMOL/L (ref 5–15)
BUN SERPL-MCNC: 24 MG/DL (ref 8–23)
BUN/CREAT SERPL: 25 (ref 7–25)
CALCIUM SPEC-SCNC: 9.9 MG/DL (ref 8.2–9.6)
CHLORIDE SERPL-SCNC: 102 MMOL/L (ref 98–107)
CO2 SERPL-SCNC: 23 MMOL/L (ref 22–29)
CREAT SERPL-MCNC: 0.96 MG/DL (ref 0.57–1)
EGFRCR SERPLBLD CKD-EPI 2021: 54.9 ML/MIN/1.73
GLUCOSE SERPL-MCNC: 84 MG/DL (ref 65–99)
POTASSIUM SERPL-SCNC: 4.1 MMOL/L (ref 3.5–5.2)
SODIUM SERPL-SCNC: 136 MMOL/L (ref 136–145)

## 2022-05-31 PROCEDURE — 74177 CT ABD & PELVIS W/CONTRAST: CPT

## 2022-05-31 PROCEDURE — 97161 PT EVAL LOW COMPLEX 20 MIN: CPT

## 2022-05-31 PROCEDURE — 97166 OT EVAL MOD COMPLEX 45 MIN: CPT

## 2022-05-31 PROCEDURE — 96372 THER/PROPH/DIAG INJ SC/IM: CPT

## 2022-05-31 PROCEDURE — G0378 HOSPITAL OBSERVATION PER HR: HCPCS

## 2022-05-31 PROCEDURE — 25010000002 PERFLUTREN (DEFINITY) 8.476 MG IN SODIUM CHLORIDE (PF) 0.9 % 10 ML INJECTION: Performed by: INTERNAL MEDICINE

## 2022-05-31 PROCEDURE — 97535 SELF CARE MNGMENT TRAINING: CPT

## 2022-05-31 PROCEDURE — 25010000002 IOPAMIDOL 61 % SOLUTION: Performed by: INTERNAL MEDICINE

## 2022-05-31 PROCEDURE — 80048 BASIC METABOLIC PNL TOTAL CA: CPT | Performed by: INTERNAL MEDICINE

## 2022-05-31 PROCEDURE — 0 DIATRIZOATE MEGLUMINE & SODIUM PER 1 ML: Performed by: INTERNAL MEDICINE

## 2022-05-31 PROCEDURE — 97162 PT EVAL MOD COMPLEX 30 MIN: CPT

## 2022-05-31 PROCEDURE — 93306 TTE W/DOPPLER COMPLETE: CPT | Performed by: INTERNAL MEDICINE

## 2022-05-31 PROCEDURE — 25010000002 THIAMINE PER 100 MG: Performed by: INTERNAL MEDICINE

## 2022-05-31 PROCEDURE — 25010000002 ENOXAPARIN PER 10 MG: Performed by: INTERNAL MEDICINE

## 2022-05-31 PROCEDURE — 93306 TTE W/DOPPLER COMPLETE: CPT

## 2022-05-31 PROCEDURE — 71260 CT THORAX DX C+: CPT

## 2022-05-31 RX ADMIN — PERFLUTREN 2 ML: 6.52 INJECTION, SUSPENSION INTRAVENOUS at 13:21

## 2022-05-31 RX ADMIN — IOPAMIDOL 100 ML: 612 INJECTION, SOLUTION INTRAVENOUS at 13:35

## 2022-05-31 RX ADMIN — THIAMINE HYDROCHLORIDE 100 MG: 100 INJECTION, SOLUTION INTRAMUSCULAR; INTRAVENOUS at 09:23

## 2022-05-31 RX ADMIN — Medication 10 ML: at 22:14

## 2022-05-31 RX ADMIN — THIAMINE HYDROCHLORIDE 100 MG: 100 INJECTION, SOLUTION INTRAMUSCULAR; INTRAVENOUS at 22:14

## 2022-05-31 RX ADMIN — ENOXAPARIN SODIUM 30 MG: 30 INJECTION SUBCUTANEOUS at 08:39

## 2022-05-31 RX ADMIN — Medication 10 ML: at 08:40

## 2022-05-31 RX ADMIN — METOPROLOL TARTRATE 100 MG: 50 TABLET, FILM COATED ORAL at 22:13

## 2022-05-31 RX ADMIN — LOSARTAN POTASSIUM 100 MG: 100 TABLET, FILM COATED ORAL at 22:13

## 2022-05-31 RX ADMIN — Medication 1000 UNITS: at 08:40

## 2022-05-31 RX ADMIN — DIATRIZOATE MEGLUMINE AND DIATRIZOATE SODIUM 30 ML: 600; 100 SOLUTION ORAL; RECTAL at 08:34

## 2022-06-01 LAB
ANION GAP SERPL CALCULATED.3IONS-SCNC: 10 MMOL/L (ref 5–15)
AORTIC DIMENSIONLESS INDEX: 0.9 (DI)
ASCENDING AORTA: 3.2 CM
BH CV ECHO MEAS - ACS: 1.83 CM
BH CV ECHO MEAS - AI P1/2T: 787.6 MSEC
BH CV ECHO MEAS - AO MAX PG: 4 MMHG
BH CV ECHO MEAS - AO MEAN PG: 2.3 MMHG
BH CV ECHO MEAS - AO ROOT DIAM: 3.1 CM
BH CV ECHO MEAS - AO V2 MAX: 100 CM/SEC
BH CV ECHO MEAS - AO V2 VTI: 24.8 CM
BH CV ECHO MEAS - AVA(I,D): 2.13 CM2
BH CV ECHO MEAS - EDV(CUBED): 137.7 ML
BH CV ECHO MEAS - EDV(MOD-SP2): 65 ML
BH CV ECHO MEAS - EDV(MOD-SP4): 89 ML
BH CV ECHO MEAS - EF(MOD-BP): 65 %
BH CV ECHO MEAS - EF(MOD-SP2): 70.8 %
BH CV ECHO MEAS - EF(MOD-SP4): 59.6 %
BH CV ECHO MEAS - ESV(CUBED): 46.6 ML
BH CV ECHO MEAS - ESV(MOD-SP2): 19 ML
BH CV ECHO MEAS - ESV(MOD-SP4): 36 ML
BH CV ECHO MEAS - FS: 30.3 %
BH CV ECHO MEAS - IVS/LVPW: 1.51 CM
BH CV ECHO MEAS - IVSD: 1.23 CM
BH CV ECHO MEAS - LAT PEAK E' VEL: 2.6 CM/SEC
BH CV ECHO MEAS - LV DIASTOLIC VOL/BSA (35-75): 59.8 CM2
BH CV ECHO MEAS - LV MASS(C)D: 197.6 GRAMS
BH CV ECHO MEAS - LV MAX PG: 3 MMHG
BH CV ECHO MEAS - LV MEAN PG: 1.61 MMHG
BH CV ECHO MEAS - LV SYSTOLIC VOL/BSA (12-30): 24.2 CM2
BH CV ECHO MEAS - LV V1 MAX: 86 CM/SEC
BH CV ECHO MEAS - LV V1 VTI: 16.8 CM
BH CV ECHO MEAS - LVIDD: 5.2 CM
BH CV ECHO MEAS - LVIDS: 3.6 CM
BH CV ECHO MEAS - LVOT AREA: 3.1 CM2
BH CV ECHO MEAS - LVOT DIAM: 2 CM
BH CV ECHO MEAS - LVPWD: 0.81 CM
BH CV ECHO MEAS - MED PEAK E' VEL: 6.4 CM/SEC
BH CV ECHO MEAS - MR MAX PG: 87.9 MMHG
BH CV ECHO MEAS - MR MAX VEL: 468.7 CM/SEC
BH CV ECHO MEAS - MV A MAX VEL: 98 CM/SEC
BH CV ECHO MEAS - MV DEC SLOPE: 190.2 CM/SEC2
BH CV ECHO MEAS - MV DEC TIME: 0.26 MSEC
BH CV ECHO MEAS - MV E MAX VEL: 45.9 CM/SEC
BH CV ECHO MEAS - MV E/A: 0.47
BH CV ECHO MEAS - MV MEAN PG: 1.34 MMHG
BH CV ECHO MEAS - MV P1/2T: 91.9 MSEC
BH CV ECHO MEAS - MV V2 VTI: 19.8 CM
BH CV ECHO MEAS - MVA(P1/2T): 2.39 CM2
BH CV ECHO MEAS - MVA(VTI): 2.7 CM2
BH CV ECHO MEAS - PA ACC SLOPE: 799 CM/SEC2
BH CV ECHO MEAS - PA ACC TIME: 0.11 SEC
BH CV ECHO MEAS - PA PR(ACCEL): 31.5 MMHG
BH CV ECHO MEAS - PA V2 MAX: 99.1 CM/SEC
BH CV ECHO MEAS - PI END-D VEL: 103.2 CM/SEC
BH CV ECHO MEAS - QP/QS: 1.56
BH CV ECHO MEAS - RAP SYSTOLE: 8 MMHG
BH CV ECHO MEAS - RV MAX PG: 2.6 MMHG
BH CV ECHO MEAS - RV V1 MAX: 80 CM/SEC
BH CV ECHO MEAS - RV V1 VTI: 19.6 CM
BH CV ECHO MEAS - RVOT DIAM: 2.31 CM
BH CV ECHO MEAS - RVSP: 22 MMHG
BH CV ECHO MEAS - SI(MOD-SP2): 30.9 ML/M2
BH CV ECHO MEAS - SI(MOD-SP4): 35.6 ML/M2
BH CV ECHO MEAS - SV(LVOT): 52.8 ML
BH CV ECHO MEAS - SV(MOD-SP2): 46 ML
BH CV ECHO MEAS - SV(MOD-SP4): 53 ML
BH CV ECHO MEAS - SV(RVOT): 82.2 ML
BH CV ECHO MEAS - TAPSE (>1.6): 2.1 CM
BH CV ECHO MEAS - TR MAX PG: 14.4 MMHG
BH CV ECHO MEAS - TR MAX VEL: 190 CM/SEC
BH CV ECHO MEASUREMENTS AVERAGE E/E' RATIO: 10.2
BUN SERPL-MCNC: 22 MG/DL (ref 8–23)
BUN/CREAT SERPL: 23.9 (ref 7–25)
CALCIUM SPEC-SCNC: 9.2 MG/DL (ref 8.2–9.6)
CHLORIDE SERPL-SCNC: 102 MMOL/L (ref 98–107)
CO2 SERPL-SCNC: 25 MMOL/L (ref 22–29)
CREAT SERPL-MCNC: 0.92 MG/DL (ref 0.57–1)
EGFRCR SERPLBLD CKD-EPI 2021: 57.8 ML/MIN/1.73
GLUCOSE SERPL-MCNC: 82 MG/DL (ref 65–99)
LEFT ATRIUM VOLUME INDEX: 71.8 ML/M2
MAGNESIUM SERPL-MCNC: 1.8 MG/DL (ref 1.7–2.3)
MAXIMAL PREDICTED HEART RATE: 126 BPM
POTASSIUM SERPL-SCNC: 3.5 MMOL/L (ref 3.5–5.2)
SODIUM SERPL-SCNC: 137 MMOL/L (ref 136–145)
STRESS TARGET HR: 107 BPM

## 2022-06-01 PROCEDURE — 84155 ASSAY OF PROTEIN SERUM: CPT | Performed by: INTERNAL MEDICINE

## 2022-06-01 PROCEDURE — 86334 IMMUNOFIX E-PHORESIS SERUM: CPT | Performed by: INTERNAL MEDICINE

## 2022-06-01 PROCEDURE — G0378 HOSPITAL OBSERVATION PER HR: HCPCS

## 2022-06-01 PROCEDURE — 25010000002 THIAMINE PER 100 MG: Performed by: INTERNAL MEDICINE

## 2022-06-01 PROCEDURE — 25010000002 ENOXAPARIN PER 10 MG: Performed by: INTERNAL MEDICINE

## 2022-06-01 PROCEDURE — 83735 ASSAY OF MAGNESIUM: CPT | Performed by: INTERNAL MEDICINE

## 2022-06-01 PROCEDURE — 83521 IG LIGHT CHAINS FREE EACH: CPT | Performed by: INTERNAL MEDICINE

## 2022-06-01 PROCEDURE — 83993 ASSAY FOR CALPROTECTIN FECAL: CPT | Performed by: INTERNAL MEDICINE

## 2022-06-01 PROCEDURE — 84165 PROTEIN E-PHORESIS SERUM: CPT | Performed by: INTERNAL MEDICINE

## 2022-06-01 PROCEDURE — 82784 ASSAY IGA/IGD/IGG/IGM EACH: CPT | Performed by: INTERNAL MEDICINE

## 2022-06-01 PROCEDURE — 80048 BASIC METABOLIC PNL TOTAL CA: CPT | Performed by: INTERNAL MEDICINE

## 2022-06-01 PROCEDURE — 96372 THER/PROPH/DIAG INJ SC/IM: CPT

## 2022-06-01 PROCEDURE — 0 MAGNESIUM SULFATE 4 GM/100ML SOLUTION: Performed by: INTERNAL MEDICINE

## 2022-06-01 PROCEDURE — 97110 THERAPEUTIC EXERCISES: CPT

## 2022-06-01 RX ADMIN — METOPROLOL TARTRATE 100 MG: 50 TABLET, FILM COATED ORAL at 20:53

## 2022-06-01 RX ADMIN — THIAMINE HYDROCHLORIDE 100 MG: 100 INJECTION, SOLUTION INTRAMUSCULAR; INTRAVENOUS at 13:25

## 2022-06-01 RX ADMIN — LOSARTAN POTASSIUM 100 MG: 100 TABLET, FILM COATED ORAL at 20:52

## 2022-06-01 RX ADMIN — Medication 1000 UNITS: at 09:13

## 2022-06-01 RX ADMIN — POTASSIUM CHLORIDE 40 MEQ: 750 TABLET, EXTENDED RELEASE ORAL at 17:55

## 2022-06-01 RX ADMIN — MAGNESIUM SULFATE HEPTAHYDRATE 4 G: 40 INJECTION, SOLUTION INTRAVENOUS at 09:13

## 2022-06-01 RX ADMIN — POTASSIUM CHLORIDE 40 MEQ: 750 TABLET, EXTENDED RELEASE ORAL at 09:13

## 2022-06-01 RX ADMIN — THIAMINE HYDROCHLORIDE 100 MG: 100 INJECTION, SOLUTION INTRAMUSCULAR; INTRAVENOUS at 20:53

## 2022-06-01 RX ADMIN — Medication 10 ML: at 21:00

## 2022-06-01 RX ADMIN — MAGNESIUM OXIDE 400 MG (241.3 MG MAGNESIUM) TABLET 400 MG: TABLET at 09:13

## 2022-06-01 RX ADMIN — ENOXAPARIN SODIUM 30 MG: 30 INJECTION SUBCUTANEOUS at 09:13

## 2022-06-01 NOTE — PROGRESS NOTES
History:  This 89-year-old lady has no real complaints this morning.  She has had a few loose bowel movements.  She had a full day yesterday as she had cross-sectional imaging echocardiogram physical therapy and Occupational Therapy evaluation.  She denies chest pain or shortness of breath.  No significant  symptoms.  No GI problems except for an occasional loose BM.    Allergies  Patient has no known allergies.      Current Facility-Administered Medications:   •  cholecalciferol (VITAMIN D3) tablet 1,000 Units, 1,000 Units, Oral, Daily, Harley Barney MD, 1,000 Units at 05/31/22 0840  •  Enoxaparin Sodium (LOVENOX) syringe 30 mg, 30 mg, Subcutaneous, Q24H, Harley Barney MD, 30 mg at 05/31/22 0839  •  losartan (COZAAR) tablet 100 mg, 100 mg, Oral, Q24H, Harley Barney MD, 100 mg at 05/31/22 2213  •  Magnesium Sulfate 2 gram Bolus, followed by 8 gram infusion (total Mg dose 10 grams)- Mg less than or equal to 1mg/dL, 2 g, Intravenous, PRN **OR** Magnesium Sulfate 2 gram / 50mL Infusion (GIVE X 3 BAGS TO EQUAL 6GM TOTAL DOSE) - Mg 1.1 - 1.5 mg/dl, 2 g, Intravenous, PRN, Last Rate: 25 mL/hr at 05/30/22 0238, 2 g at 05/30/22 0238 **OR** Magnesium Sulfate 4 gram infusion- Mg 1.6-1.9 mg/dL, 4 g, Intravenous, PRN, Harley Barney MD  •  metoprolol tartrate (LOPRESSOR) tablet 100 mg, 100 mg, Oral, Daily, Harley Barney MD, 100 mg at 05/31/22 2213  •  potassium chloride (K-DUR,KLOR-CON) ER tablet 40 mEq, 40 mEq, Oral, PRN, 40 mEq at 05/30/22 1624 **OR** potassium chloride (KLOR-CON) packet 40 mEq, 40 mEq, Oral, PRN, 40 mEq at 05/30/22 1156 **OR** potassium chloride 10 mEq in 100 mL IVPB, 10 mEq, Intravenous, Q1H PRN, Harley Braney MD  •  sodium chloride 0.9 % flush 10 mL, 10 mL, Intravenous, Q12H, Harley Barney MD, 10 mL at 05/31/22 8703  •  sodium chloride 0.9 % flush 10 mL, 10 mL, Intravenous, PRN, Harley Barney MD  •  thiamine (B-1) 100 mg in sodium chloride 0.9 % 100 mL IVPB, 100 mg, Intravenous, BID, Harley Barney,  "MD, Last Rate: 200 mL/hr at 05/31/22 2214, 100 mg at 05/31/22 2214    /93 (BP Location: Right arm, Patient Position: Lying)   Pulse 66   Temp 97 °F (36.1 °C) (Oral)   Resp 16   Ht 162.6 cm (64\")   Wt 47.6 kg (105 lb)   SpO2 100%   BMI 18.02 kg/m²     Physical Exam  Appearance: Cachectic elderly lady who is lying in bed in no distress.  Lungs: Few rhonchi in the bases but otherwise clear  Heart: Regular rate and rhythm.  Abdomen: Benign.  Normal active bowel sounds.  Extremities: Without edema clubbing or cyanosis.  Neurologic: Alert and oriented x3.  Unchanged from prior exams    Lab Results (last 24 hours)     Procedure Component Value Units Date/Time    Magnesium [[id number]]  (Normal) Collected: 06/01/22 0536    Specimen: Blood Updated: 06/01/22 0618     Magnesium 1.8 mg/dL     Basic Metabolic Panel [[id number]]  (Abnormal) Collected: 06/01/22 0536    Specimen: Blood Updated: 06/01/22 0616     Glucose 82 mg/dL      BUN 22 mg/dL      Creatinine 0.92 mg/dL      Sodium 137 mmol/L      Potassium 3.5 mmol/L      Chloride 102 mmol/L      CO2 25.0 mmol/L      Calcium 9.2 mg/dL      BUN/Creatinine Ratio 23.9     Anion Gap 10.0 mmol/L      eGFR 57.8 mL/min/1.73      Comment: National Kidney Foundation and American Society of Nephrology (ASN) Task Force recommended calculation based on the Chronic Kidney Disease Epidemiology Collaboration (CKD-EPI) equation refit without adjustment for race.       Narrative:      GFR Normal >60  Chronic Kidney Disease <60  Kidney Failure <15          CT chest, abdomen and pelvis:1. Moderate-sized pericardial effusion.  2. Trace pleural effusions.  3. Patchy areas of chronic parenchymal change, pneumonia and/or  atelectasis in the bilateral lungs.  4. Multiple hepatic cysts.  5. Mild wall thickening of the colon is consistent with mild colitis. No  free air or abscess is seen. Also, there is no evidence of bowel  obstruction.    Echocardiogram: Final report is pending.  " Preliminary report notes ejection fraction of 65    Imp:  1.  Asthenia and weight loss  2.  Orthostatic hypotension.  3.  Probable pericardial effusion  4.  Hypomagnesia, hypokalemia (corrected)  5.  Essential hypertension  6.  Macular degeneration  7.  Osteoarthritis    Upon no signal for malignancy or active infection in this case.  Still I am having trouble putting everything together.  Amyloidosis is a possibility which could explain most of the patient is findings.      Plan:  We will get await the final report of the echocardiogram.  I am going to supplement the patient with a little bit of magnesium.  I am going to check a stool calprotectin  Going to check paraprotein lab.  If the echo shows findings consistent with amyloid a PYP scan may be in order.  I am not finding any really other actionable diagnosis.  The patient will need to be sent to a rehab facility for aggressive physical therapy and Occupational Therapy    Harley Barney MD  6/1/2022  07:14 EDT

## 2022-06-01 NOTE — PLAN OF CARE
Goal Outcome Evaluation:  Plan of Care Reviewed With: patient           Outcome Evaluation: Pt cont to present with significant LE weakness R>L, pain in R thigh limits ability to extend R knee, pt had difficulty walking today, only able to walk 5 ft forward and backward with walker, impaired balance, rec SNF at discharge as this pt was vary active prior to admission

## 2022-06-01 NOTE — CASE MANAGEMENT/SOCIAL WORK
Continued Stay Note  [facility]     Patient Name: Liudmila Aguayo  MRN: [id number]  Today's Date: 6/1/2022    Admit Date: 5/29/2022     Discharge Plan     Row Name 06/01/22 1602       Plan    Plan [facility], pending precert.    Plan Comments Received return call from [place] with [facility], they will have a bed and she will initiate precert today.  Will continue to monitor for new or changing discharge needs.  Alisa CRAWLEY RN CCP               Discharge Codes    No documentation.               Expected Discharge Date and Time     Expected Discharge Date Expected Discharge Time    [place] 3, 2022             Alisa Redd RN

## 2022-06-01 NOTE — THERAPY TREATMENT NOTE
Patient Name: Liudmila Aguayo  : 1928    MRN: [id number]                              Today's Date: 2022       Admit Date: 2022    Visit Dx:     ICD-10-CM ICD-9-CM   1. General weakness  R53.1 780.79   2. Fatigue, unspecified type  R53.83 780.79   3. Kidney function test abnormal  R94.4 794.4     Patient Active Problem List   Diagnosis   • Osteoporosis   • Colitis   • General weakness   • Severe malnutrition (HCC)     Past Medical History:   Diagnosis Date   • Anemia    • Colitis    • History of irregular heartbeat    • Hypertension    • Lower GI bleed    • Macular degeneration    • Osteoporosis    • Osteoporosis    • PNA (pneumonia)     25 years ago   • Urinary tract infection      Past Surgical History:   Procedure Laterality Date   • APPENDECTOMY     • SKIN CANCER EXCISION        General Information     Row Name 22 1451          Physical Therapy Time and Intention    Document Type therapy note (daily note)  -PC     Mode of Treatment physical therapy  -PC     Row Name 22 1451          General Information    Existing Precautions/Restrictions fall  -PC     Row Name 22 1451          Cognition    Orientation Status (Cognition) oriented x 4  -PC           User Key  (r) = Recorded By, (t) = Taken By, (c) = Cosigned By    Initials Name Provider Type    PC Elvira Mcneil PT Physical Therapist               Mobility     Row Name 22 1451          Bed Mobility    Comment, (Bed Mobility) in chair  -PC     Row Name 22 1451          Sit-Stand Transfer    Sit-Stand [place] (Transfers) minimum assist (75% patient effort)  -PC     Assistive Device (Sit-Stand Transfers) walker, front-wheeled  -PC     Row Name 22 1451          Gait/Stairs (Locomotion)    [place] Level (Gait) minimum assist (75% patient effort);moderate assist (50% patient effort);2 person assist  -PC     Assistive Device (Gait) walker, front-wheeled  -PC     Distance in Feet (Gait) 5 ft forward and 5  ft backward  -PC     Deviations/Abnormal Patterns (Gait) antalgic;gait speed decreased;stride length decreased  -PC           User Key  (r) = Recorded By, (t) = Taken By, (c) = Cosigned By    Initials Name Provider Type    Elvira Price, PT Physical Therapist               Obj/Interventions     Row Name 06/01/22 1453          Motor Skills    Therapeutic Exercise --  AP, LAQ, hip flexion, pt had a difficult time with R knee extension, shoulder rolls and shoulder flexion 5-10 reps  -PC           User Key  (r) = Recorded By, (t) = Taken By, (c) = Cosigned By    Initials Name Provider Type    PC Elvira Mcneil, PT Physical Therapist               Goals/Plan    No documentation.                Clinical Impression     Row Name 06/01/22 1454          Pain    Pretreatment Pain Rating 0/10 - no pain  -     Row Name 06/01/22 1454          Plan of Care Review    Plan of Care Reviewed With patient  -PC     Outcome Evaluation Pt cont to present with significant LE weakness R>L, pain in R thigh limits ability to extend R knee, pt had difficulty walking today, only able to walk 5 ft forward and backward with walker, impaired balance, rec SNF at discharge as this pt was vary active prior to admission  -     Row Name 06/01/22 1454          Positioning and Restraints    Pre-Treatment Position sitting in chair/recliner  -PC     Post Treatment Position chair  -PC     In Chair sitting;call light within reach;encouraged to call for assist;exit alarm on  -PC           User Key  (r) = Recorded By, (t) = Taken By, (c) = Cosigned By    Initials Name Provider Type    Elvira Price, PT Physical Therapist               Outcome Measures     Row Name 06/01/22 1457 06/01/22 0845       How much help from another person do you currently need...    Turning from your back to your side while in flat bed without using bedrails? 3  -PC 3  -CD    Moving from lying on back to sitting on the side of a flat bed without bedrails? 3  -PC 3  -CD     Moving to and from a bed to a chair (including a wheelchair)? 2  -PC 3  -CD    Standing up from a chair using your arms (e.g., wheelchair, bedside chair)? 2  -PC 3  -CD    Climbing 3-5 steps with a railing? 2  -PC 2  -CD    To walk in hospital room? 2  -PC 3  -CD    AM-PAC 6 Clicks Score (PT) 14  -PC 17  -CD    Highest level of mobility 4 --> Transferred to chair/commode  -PC 5 --> Static standing  -CD          User Key  (r) = Recorded By, (t) = Taken By, (c) = Cosigned By    Initials Name Provider Type    PC Elvira Mcneil, PT Physical Therapist    CD Lisette Chavez RN Registered Nurse                             Physical Therapy Education                 Title: PT OT SLP Therapies (In Progress)     Topic: Physical Therapy (Done)     Point: Mobility training (Done)     Learning Progress Summary           Patient Acceptance, E,D, DU,NR by  at 6/1/2022 1457    Acceptance, E,TB, VU by  at 5/31/2022 1318                   Point: Home exercise program (Done)     Learning Progress Summary           Patient Acceptance, E,D, DU,NR by  at 6/1/2022 1457                   Point: Body mechanics (Done)     Learning Progress Summary           Patient Acceptance, E,D, DU,NR by  at 6/1/2022 1457    Acceptance, E,TB, VU by  at 5/31/2022 1318                   Point: Precautions (Done)     Learning Progress Summary           Patient Acceptance, E,D, DU,NR by  at 6/1/2022 1457    Acceptance, E,TB, VU by  at 5/31/2022 1318                               User Key     Initials Effective Dates Name Provider Type Discipline     06/16/21 -  Elvira Mcneil PT Physical Therapist PT     05/02/22 -  Yoana Sommer PT Physical Therapist PT              PT Recommendation and Plan     Plan of Care Reviewed With: patient  Outcome Evaluation: Pt cont to present with significant LE weakness R>L, pain in R thigh limits ability to extend R knee, pt had difficulty walking today, only able to walk 5 ft forward and backward  with walker, impaired balance, rec SNF at discharge as this pt was vary active prior to admission     Time Calculation:    PT Charges     Row Name 06/01/22 1458             Time Calculation    Start Time 1317  -PC      Stop Time 1338  -PC      Time Calculation (min) 21 min  -PC      PT Received On 06/01/22  -PC      PT - Next Appointment 06/02/22  -PC            User Key  (r) = Recorded By, (t) = Taken By, (c) = Cosigned By    Initials Name Provider Type    PC Elvira Mcneil PT Physical Therapist              Therapy Charges for Today     Code Description Service Date Service Provider Modifiers Qty    22373982859 HC PT THER PROC EA 15 MIN 6/1/2022 Elvira Mcneil, PT GP 1          PT G-Codes  Outcome Measure Options: AM-PAC 6 Clicks Basic Mobility (PT)  AM-PAC 6 Clicks Score (PT): 14  AM-PAC 6 Clicks Score (OT): 15  Modified Morrison Scale: 4 - Moderately severe disability.  Unable to walk without assistance, and unable to attend to own bodily needs without assistance.    Elvira Mcneil, ISREAL  6/1/2022

## 2022-06-01 NOTE — PLAN OF CARE
Goal Outcome Evaluation:  Plan of Care Reviewed With: patient        Progress: no change  Outcome Evaluation: VSS. Up to BR with assist x1. Up to chair most of the day. Magnesium and potassium replaced

## 2022-06-01 NOTE — PLAN OF CARE
Goal Outcome Evaluation:           Progress: improving  Outcome Evaluation: BP elevated this AM, other VSS. Afebrile, on room air. Thiamine given, up with assist x1 to BSC, 1 BM, voiding without difficulty, no c/o pain, SL, falls precautions maintained.

## 2022-06-02 ENCOUNTER — APPOINTMENT (OUTPATIENT)
Dept: GENERAL RADIOLOGY | Facility: HOSPITAL | Age: 87
End: 2022-06-02

## 2022-06-02 LAB
ALBUMIN SERPL ELPH-MCNC: 3.2 G/DL (ref 2.9–4.4)
ALBUMIN/GLOB SERPL: 1.1 {RATIO} (ref 0.7–1.7)
ALPHA1 GLOB SERPL ELPH-MCNC: 0.3 G/DL (ref 0–0.4)
ALPHA2 GLOB SERPL ELPH-MCNC: 0.9 G/DL (ref 0.4–1)
B-GLOBULIN SERPL ELPH-MCNC: 0.9 G/DL (ref 0.7–1.3)
GAMMA GLOB SERPL ELPH-MCNC: 1 G/DL (ref 0.4–1.8)
GLOBULIN SER-MCNC: 3.2 G/DL (ref 2.2–3.9)
IGA SERPL-MCNC: 458 MG/DL (ref 64–422)
IGG SERPL-MCNC: 947 MG/DL (ref 586–1602)
IGM SERPL-MCNC: 166 MG/DL (ref 26–217)
INTERPRETATION SERPL IEP-IMP: ABNORMAL
LABORATORY COMMENT REPORT: ABNORMAL
M PROTEIN SERPL ELPH-MCNC: ABNORMAL G/DL
MAGNESIUM SERPL-MCNC: 2.9 MG/DL (ref 1.7–2.3)
POTASSIUM SERPL-SCNC: 5.1 MMOL/L (ref 3.5–5.2)
PROT SERPL-MCNC: 6.4 G/DL (ref 6–8.5)
SARS-COV-2 RNA RESP QL NAA+PROBE: NOT DETECTED

## 2022-06-02 PROCEDURE — 96367 TX/PROPH/DG ADDL SEQ IV INF: CPT

## 2022-06-02 PROCEDURE — 96366 THER/PROPH/DIAG IV INF ADDON: CPT

## 2022-06-02 PROCEDURE — 72114 X-RAY EXAM L-S SPINE BENDING: CPT

## 2022-06-02 PROCEDURE — 84132 ASSAY OF SERUM POTASSIUM: CPT | Performed by: INTERNAL MEDICINE

## 2022-06-02 PROCEDURE — 73502 X-RAY EXAM HIP UNI 2-3 VIEWS: CPT

## 2022-06-02 PROCEDURE — G0378 HOSPITAL OBSERVATION PER HR: HCPCS

## 2022-06-02 PROCEDURE — 86335 IMMUNFIX E-PHORSIS/URINE/CSF: CPT | Performed by: INTERNAL MEDICINE

## 2022-06-02 PROCEDURE — 25010000002 THIAMINE PER 100 MG: Performed by: INTERNAL MEDICINE

## 2022-06-02 PROCEDURE — U0003 INFECTIOUS AGENT DETECTION BY NUCLEIC ACID (DNA OR RNA); SEVERE ACUTE RESPIRATORY SYNDROME CORONAVIRUS 2 (SARS-COV-2) (CORONAVIRUS DISEASE [COVID-19]), AMPLIFIED PROBE TECHNIQUE, MAKING USE OF HIGH THROUGHPUT TECHNOLOGIES AS DESCRIBED BY CMS-2020-01-R: HCPCS | Performed by: INTERNAL MEDICINE

## 2022-06-02 PROCEDURE — 96372 THER/PROPH/DIAG INJ SC/IM: CPT

## 2022-06-02 PROCEDURE — 97535 SELF CARE MNGMENT TRAINING: CPT

## 2022-06-02 PROCEDURE — 25010000002 ENOXAPARIN PER 10 MG: Performed by: INTERNAL MEDICINE

## 2022-06-02 PROCEDURE — 83735 ASSAY OF MAGNESIUM: CPT | Performed by: INTERNAL MEDICINE

## 2022-06-02 RX ORDER — ACETAMINOPHEN 325 MG/1
650 TABLET ORAL EVERY 6 HOURS PRN
Status: DISCONTINUED | OUTPATIENT
Start: 2022-06-02 | End: 2022-06-03 | Stop reason: HOSPADM

## 2022-06-02 RX ADMIN — Medication 10 ML: at 20:38

## 2022-06-02 RX ADMIN — THIAMINE HYDROCHLORIDE 100 MG: 100 INJECTION, SOLUTION INTRAMUSCULAR; INTRAVENOUS at 20:33

## 2022-06-02 RX ADMIN — ACETAMINOPHEN 325MG 650 MG: 325 TABLET ORAL at 17:53

## 2022-06-02 RX ADMIN — Medication 1000 UNITS: at 09:49

## 2022-06-02 RX ADMIN — METOPROLOL TARTRATE 100 MG: 50 TABLET, FILM COATED ORAL at 20:32

## 2022-06-02 RX ADMIN — LOSARTAN POTASSIUM 100 MG: 100 TABLET, FILM COATED ORAL at 20:33

## 2022-06-02 RX ADMIN — THIAMINE HYDROCHLORIDE 100 MG: 100 INJECTION, SOLUTION INTRAMUSCULAR; INTRAVENOUS at 09:49

## 2022-06-02 RX ADMIN — ENOXAPARIN SODIUM 30 MG: 30 INJECTION SUBCUTANEOUS at 09:49

## 2022-06-02 NOTE — THERAPY TREATMENT NOTE
Patient Name: Liudmila Aguayo  : 1928    MRN: [id number]                              Today's Date: 2022       Admit Date: 2022    Visit Dx:     ICD-10-CM ICD-9-CM   1. General weakness  R53.1 780.79   2. Fatigue, unspecified type  R53.83 780.79   3. Kidney function test abnormal  R94.4 794.4     Patient Active Problem List   Diagnosis   • Osteoporosis   • Colitis   • General weakness   • Severe malnutrition (HCC)     Past Medical History:   Diagnosis Date   • Anemia    • Colitis    • History of irregular heartbeat    • Hypertension    • Lower GI bleed    • Macular degeneration    • Osteoporosis    • Osteoporosis    • PNA (pneumonia)     25 years ago   • Urinary tract infection      Past Surgical History:   Procedure Laterality Date   • APPENDECTOMY     • SKIN CANCER EXCISION        General Information     Row Name 22 1110          OT Time and Intention    Document Type therapy note (daily note)  -LE     Mode of Treatment individual therapy;occupational therapy  -     Row Name 22 1110          General Information    Existing Precautions/Restrictions fall  -     Row Name 22 1110          Cognition    Orientation Status (Cognition) oriented x 4  -     Row Name 22 1110          Safety Issues, Functional Mobility    Impairments Affecting Function (Mobility) balance;endurance/activity tolerance  -PREET     Comment, Safety Issues/Impairments (Mobility) no skid socks and gait belt.  -PREET           User Key  (r) = Recorded By, (t) = Taken By, (c) = Cosigned By    Initials Name Provider Type    Lizy Anderson OTR Occupational Therapist                 Mobility/ADL's     Row Name 22 1111          Bed Mobility    Comment, (Bed Mobility) in chair.  -PREET     Row Name 22 1111          Transfers    Transfers toilet transfer;sit-stand transfer;stand-sit transfer  -PREET     Sit-Stand [place] (Transfers) contact guard;verbal cues  -PREET     Stand-Sit [place] (Transfers)  contact guard;verbal cues  -LE     [place] Level (Toilet Transfer) contact guard;verbal cues  -LE     Assistive Device (Toilet Transfer) walker, front-wheeled  -LE     Row Name 06/02/22 1111          Sit-Stand Transfer    Assistive Device (Sit-Stand Transfers) walker, front-wheeled  -LE     Row Name 06/02/22 1111          Stand-Sit Transfer    Assistive Device (Stand-Sit Transfers) walker, front-wheeled  -LE     Row Name 06/02/22 1111          Toilet Transfer    Type (Toilet Transfer) stand pivot/stand step  -LE     Row Name 06/02/22 1111          Functional Mobility    Functional Mobility- Ind. Level contact guard assist  -LE     Functional Mobility- Device walker, front-wheeled  -LE     Functional Mobility-Distance (Feet) --  chair to bathroom to toilet to sink to chair.  -LE     Functional Mobility- Safety Issues step length decreased  -LE     Functional Mobility- Comment slow shuffle steps.  guide assist with walker at times  -     Row Name 06/02/22 1111          Activities of Daily Living    BADL Assessment/Intervention toileting;grooming  -     Row Name 06/02/22 1111          Grooming Assessment/Training    [place] Level (Grooming) set up;standby assist;wash face, hands;oral care regimen  -LE     Position (Grooming) supported standing  -LE     Comment, (Grooming) open packages, SBA with pt leans against sink  -LE     Row Name 06/02/22 1111          Toileting Assessment/Training    [place] Level (Toileting) change pad/brief;perform perineal hygiene;maximum assist (25% patient effort)  -     Comment, (Toileting) setup and CGA for hygiene.  dependent to change brief.  -LE           User Key  (r) = Recorded By, (t) = Taken By, (c) = Cosigned By    Initials Name Provider Type    Lizy Anderson OTR Occupational Therapist               Obj/Interventions     Row Name 06/02/22 1113          Range of Motion Comprehensive    Comment, General Range of Motion B UE shld about 2/3.  -     Row Name  06/02/22 1113          Shoulder (Therapeutic Exercise)    Shoulder (Therapeutic Exercise) AROM (active range of motion)  -LE     Shoulder AROM (Therapeutic Exercise) 10 repetitions;2 sets;sitting  -LE     Row Name 06/02/22 1113          Motor Skills    Therapeutic Exercise shoulder  -LE     Row Name 06/02/22 1113          Balance    Balance Assessment sitting static balance;standing static balance;standing dynamic balance  -LE     Static Sitting Balance standby assist  -LE     Static Standing Balance standby assist  -LE     Dynamic Standing Balance contact guard  -LE     Position/Device Used, Standing Balance walker, front-wheeled  -LE     Balance Interventions standing;supported  -LE     Comment, Balance cues for trunk activation when standing.  one wobble when stand at walker during ADL.  -LE           User Key  (r) = Recorded By, (t) = Taken By, (c) = Cosigned By    Initials Name Provider Type    Lizy Anderson OTR Occupational Therapist               Goals/Plan    No documentation.                Clinical Impression     Row Name 06/02/22 1115          Pain Assessment    Pretreatment Pain Rating 5/10  -LE     Posttreatment Pain Rating 5/10  -LE     Pain Location - Side/Orientation Right  -LE     Pain Location lower  -LE     Pain Location - extremity  -LE     Pain Intervention(s) Rest;Repositioned  -LE     Row Name 06/02/22 1115          Plan of Care Review    Plan of Care Reviewed With patient  -LE     Progress improving  -LE     Outcome Evaluation Pt demo improving balance and mobility today and able to ambulate to bathroom with walker. Mod/max A for toileting and set up/SBA for grooming.  Pt slow and short shuffle steps at walker.  -LE     Row Name 06/02/22 1115          Therapy Plan Review/Discharge Plan (OT)    Anticipated Discharge Disposition (OT) skilled nursing facility  -     Row Name 06/02/22 1115          Vital Signs    O2 Delivery Pre Treatment room air  -LE     Pre Patient Position Sitting  -LE      Intra Patient Position Standing  -LE     Post Patient Position Sitting  -LE     Row Name 06/02/22 1115          Positioning and Restraints    Pre-Treatment Position sitting in chair/recliner  -LE     Post Treatment Position chair  -LE     In Chair notified nsg;reclined;call light within reach;encouraged to call for assist;exit alarm on  -LE           User Key  (r) = Recorded By, (t) = Taken By, (c) = Cosigned By    Initials Name Provider Type    Lizy Anderson OTR Occupational Therapist               Outcome Measures     Row Name 06/02/22 1119          How much help from another is currently needed...    Putting on and taking off regular lower body clothing? 2  -LE     Bathing (including washing, rinsing, and drying) 2  -LE     Toileting (which includes using toilet bed pan or urinal) 2  -LE     Putting on and taking off regular upper body clothing 3  -LE     Taking care of personal grooming (such as brushing teeth) 3  -LE     Eating meals 3  -LE     AM-PAC 6 Clicks Score (OT) 15  -LE     Row Name 06/02/22 1119          Functional Assessment    Outcome Measure Options AM-PAC 6 Clicks Daily Activity (OT)  -LE           User Key  (r) = Recorded By, (t) = Taken By, (c) = Cosigned By    Initials Name Provider Type    Lizy Anderson OTR Occupational Therapist                Occupational Therapy Education                 Title: PT OT SLP Therapies (In Progress)     Topic: Occupational Therapy (In Progress)     Point: ADL training (Done)     Description:   Instruct learner(s) on proper safety adaptation and remediation techniques during self care or transfers.   Instruct in proper use of assistive devices.              Learning Progress Summary           Patient Acceptance, E, VU by JANETH at 5/31/2022 1234    Comment: OT educ on OT role in therapeutic process and pt's POC.                   Point: Home exercise program (Not Started)     Description:   Instruct learner(s) on appropriate technique for monitoring,  assisting and/or progressing therapeutic exercises/activities.              Learner Progress:  Not documented in this visit.          Point: Precautions (Not Started)     Description:   Instruct learner(s) on prescribed precautions during self-care and functional transfers.              Learner Progress:  Not documented in this visit.          Point: Body mechanics (Not Started)     Description:   Instruct learner(s) on proper positioning and spine alignment during self-care, functional mobility activities and/or exercises.              Learner Progress:  Not documented in this visit.                      User Key     Initials Effective Dates Name Provider Type Discipline    RD 06/16/21 -  Chiquita Ceron, OT Occupational Therapist OT              OT Recommendation and Plan     Plan of Care Review  Plan of Care Reviewed With: patient  Progress: improving  Outcome Evaluation: Pt demo improving balance and mobility today and able to ambulate to bathroom with walker. Mod/max A for toileting and set up/SBA for grooming.  Pt slow and short shuffle steps at walker.     Time Calculation:    Time Calculation- OT     Row Name 06/02/22 1120             Time Calculation- OT    OT Start Time 1050  -LE      OT Stop Time 1120  -LE      OT Time Calculation (min) 30 min  -LE      Total Timed Code Minutes- OT 30 minute(s)  -LE      OT Received On 06/02/22  -LE      OT - Next Appointment 06/03/22  -LE              Timed Charges    32139 - OT Self Care/Mgmt Minutes 30  -LE              Total Minutes    Timed Charges Total Minutes 30  -LE       Total Minutes 30  -LE            User Key  (r) = Recorded By, (t) = Taken By, (c) = Cosigned By    Initials Name Provider Type    LE Lizy Middleton, OTR Occupational Therapist              Therapy Charges for Today     Code Description Service Date Service Provider Modifiers Qty    [id number]  OT SELF CARE/MGMT/TRAIN EA 15 MIN 6/2/2022 Lizy Middleton, OTR GO 2               Lizy Middleton  OTR  6/2/2022

## 2022-06-02 NOTE — PLAN OF CARE
Goal Outcome Evaluation:           Progress: no change  Outcome Evaluation: vss, alert and orient x2 , some confusion to situation and time. forgetfulness. complains of mild pain to lew legs. tylenol given

## 2022-06-02 NOTE — PLAN OF CARE
Goal Outcome Evaluation:  Plan of Care Reviewed With: patient        Progress: no change  Outcome Evaluation: VSS, falls precautions maintained, A/O w/ confusion at times, up to BSC w/ assist, no c/o pain

## 2022-06-02 NOTE — PROGRESS NOTES
Continued Stay Note  [facility]     Patient Name: Liudmila Aguayo  MRN: [id number]  Today's Date: 6/2/2022    Admit Date: 5/29/2022     Discharge Plan     Row Name 06/02/22 1531       Plan    Plan [facility] Pre-cert obtained (bed available on 6/3)    Plan Comments Per [place] with [facility], pre-cert obatined fro [facility] and bed will be available tomorrow. Patient will need an updated COVID test. Updated patient and RN.               Discharge Codes    No documentation.               Expected Discharge Date and Time     Expected Discharge Date Expected Discharge Time    [place] 3, 2022             Grecia Estrada, RN

## 2022-06-02 NOTE — PLAN OF CARE
Goal Outcome Evaluation:  Plan of Care Reviewed With: patient        Progress: improving  Outcome Evaluation: Pt demo improving balance and mobility today and able to ambulate to bathroom with walker. Mod/max A for toileting and set up/SBA for grooming.  Pt slow and short shuffle steps at walker.        Patient was not placed in a face mask during this therapy encounter. Therapist used appropriate personal protective equipment including surgical mask and gloves during the entire therapy session. Hand hygiene was completed before and after therapy session. Patient is not in enhanced droplet precautions.

## 2022-06-03 VITALS
BODY MASS INDEX: 17.93 KG/M2 | DIASTOLIC BLOOD PRESSURE: 87 MMHG | SYSTOLIC BLOOD PRESSURE: 172 MMHG | TEMPERATURE: 97.9 F | OXYGEN SATURATION: 98 % | WEIGHT: 105 LBS | HEIGHT: 64 IN | HEART RATE: 75 BPM | RESPIRATION RATE: 18 BRPM

## 2022-06-03 LAB
ANION GAP SERPL CALCULATED.3IONS-SCNC: 7.4 MMOL/L (ref 5–15)
BACTERIA UR QL AUTO: ABNORMAL /HPF
BASOPHILS # BLD AUTO: 0.06 10*3/MM3 (ref 0–0.2)
BASOPHILS NFR BLD AUTO: 0.6 % (ref 0–1.5)
BILIRUB UR QL STRIP: NEGATIVE
BUN SERPL-MCNC: 25 MG/DL (ref 8–23)
BUN/CREAT SERPL: 30.5 (ref 7–25)
CALCIUM SPEC-SCNC: 8.7 MG/DL (ref 8.2–9.6)
CHLORIDE SERPL-SCNC: 106 MMOL/L (ref 98–107)
CLARITY UR: CLEAR
CO2 SERPL-SCNC: 23.6 MMOL/L (ref 22–29)
COLOR UR: YELLOW
CREAT SERPL-MCNC: 0.82 MG/DL (ref 0.57–1)
DEPRECATED RDW RBC AUTO: 41.9 FL (ref 37–54)
EGFRCR SERPLBLD CKD-EPI 2021: 66.4 ML/MIN/1.73
EOSINOPHIL # BLD AUTO: 0.42 10*3/MM3 (ref 0–0.4)
EOSINOPHIL NFR BLD AUTO: 4.3 % (ref 0.3–6.2)
ERYTHROCYTE [DISTWIDTH] IN BLOOD BY AUTOMATED COUNT: 13.1 % (ref 12.3–15.4)
GLUCOSE SERPL-MCNC: 76 MG/DL (ref 65–99)
GLUCOSE UR STRIP-MCNC: NEGATIVE MG/DL
HCT VFR BLD AUTO: 31.6 % (ref 34–46.6)
HGB BLD-MCNC: 10.8 G/DL (ref 12–15.9)
HGB UR QL STRIP.AUTO: ABNORMAL
HYALINE CASTS UR QL AUTO: ABNORMAL /LPF
IMM GRANULOCYTES # BLD AUTO: 0.04 10*3/MM3 (ref 0–0.05)
IMM GRANULOCYTES NFR BLD AUTO: 0.4 % (ref 0–0.5)
KAPPA LC FREE SER-MCNC: 50 MG/L (ref 3.3–19.4)
KAPPA LC FREE/LAMBDA FREE SER: 1.44 {RATIO} (ref 0.26–1.65)
KETONES UR QL STRIP: NEGATIVE
LAMBDA LC FREE SERPL-MCNC: 34.7 MG/L (ref 5.7–26.3)
LEUKOCYTE ESTERASE UR QL STRIP.AUTO: ABNORMAL
LYMPHOCYTES # BLD AUTO: 0.85 10*3/MM3 (ref 0.7–3.1)
LYMPHOCYTES NFR BLD AUTO: 8.7 % (ref 19.6–45.3)
MAGNESIUM SERPL-MCNC: 2.3 MG/DL (ref 1.7–2.3)
MCH RBC QN AUTO: 30.2 PG (ref 26.6–33)
MCHC RBC AUTO-ENTMCNC: 34.2 G/DL (ref 31.5–35.7)
MCV RBC AUTO: 88.3 FL (ref 79–97)
MONOCYTES # BLD AUTO: 1 10*3/MM3 (ref 0.1–0.9)
MONOCYTES NFR BLD AUTO: 10.2 % (ref 5–12)
NEUTROPHILS NFR BLD AUTO: 7.41 10*3/MM3 (ref 1.7–7)
NEUTROPHILS NFR BLD AUTO: 75.8 % (ref 42.7–76)
NITRITE UR QL STRIP: POSITIVE
NRBC BLD AUTO-RTO: 0 /100 WBC (ref 0–0.2)
PH UR STRIP.AUTO: 7 [PH] (ref 5–8)
PLATELET # BLD AUTO: 304 10*3/MM3 (ref 140–450)
PMV BLD AUTO: 11 FL (ref 6–12)
POTASSIUM SERPL-SCNC: 4.3 MMOL/L (ref 3.5–5.2)
PROT UR QL STRIP: ABNORMAL
RBC # BLD AUTO: 3.58 10*6/MM3 (ref 3.77–5.28)
RBC # UR STRIP: ABNORMAL /HPF
REF LAB TEST METHOD: ABNORMAL
SODIUM SERPL-SCNC: 137 MMOL/L (ref 136–145)
SP GR UR STRIP: 1.01 (ref 1–1.03)
SQUAMOUS #/AREA URNS HPF: ABNORMAL /HPF
UROBILINOGEN UR QL STRIP: ABNORMAL
WBC # UR STRIP: ABNORMAL /HPF
WBC NRBC COR # BLD: 9.78 10*3/MM3 (ref 3.4–10.8)

## 2022-06-03 PROCEDURE — G0378 HOSPITAL OBSERVATION PER HR: HCPCS

## 2022-06-03 PROCEDURE — 87086 URINE CULTURE/COLONY COUNT: CPT | Performed by: INTERNAL MEDICINE

## 2022-06-03 PROCEDURE — 81001 URINALYSIS AUTO W/SCOPE: CPT | Performed by: INTERNAL MEDICINE

## 2022-06-03 PROCEDURE — 80048 BASIC METABOLIC PNL TOTAL CA: CPT | Performed by: INTERNAL MEDICINE

## 2022-06-03 PROCEDURE — 96372 THER/PROPH/DIAG INJ SC/IM: CPT

## 2022-06-03 PROCEDURE — 83735 ASSAY OF MAGNESIUM: CPT | Performed by: INTERNAL MEDICINE

## 2022-06-03 PROCEDURE — 87088 URINE BACTERIA CULTURE: CPT | Performed by: INTERNAL MEDICINE

## 2022-06-03 PROCEDURE — 25010000002 ENOXAPARIN PER 10 MG: Performed by: INTERNAL MEDICINE

## 2022-06-03 PROCEDURE — 85025 COMPLETE CBC W/AUTO DIFF WBC: CPT | Performed by: INTERNAL MEDICINE

## 2022-06-03 PROCEDURE — 87186 SC STD MICRODIL/AGAR DIL: CPT | Performed by: INTERNAL MEDICINE

## 2022-06-03 PROCEDURE — 97110 THERAPEUTIC EXERCISES: CPT

## 2022-06-03 RX ORDER — SULFAMETHOXAZOLE AND TRIMETHOPRIM 800; 160 MG/1; MG/1
1 TABLET ORAL EVERY 12 HOURS SCHEDULED
Status: DISCONTINUED | OUTPATIENT
Start: 2022-06-03 | End: 2022-06-03 | Stop reason: HOSPADM

## 2022-06-03 RX ORDER — SULFAMETHOXAZOLE AND TRIMETHOPRIM 800; 160 MG/1; MG/1
1 TABLET ORAL 2 TIMES DAILY
Qty: 5 TABLET | Refills: 0 | Status: SHIPPED | OUTPATIENT
Start: 2022-06-03

## 2022-06-03 RX ORDER — ACETAMINOPHEN 325 MG/1
650 TABLET ORAL EVERY 6 HOURS PRN
Qty: 300 TABLET | Refills: 1 | Status: SHIPPED | OUTPATIENT
Start: 2022-06-03

## 2022-06-03 RX ADMIN — ACETAMINOPHEN 325MG 650 MG: 325 TABLET ORAL at 09:48

## 2022-06-03 RX ADMIN — SULFAMETHOXAZOLE AND TRIMETHOPRIM 1 TABLET: 800; 160 TABLET ORAL at 12:30

## 2022-06-03 RX ADMIN — MAGNESIUM OXIDE 400 MG (241.3 MG MAGNESIUM) TABLET 400 MG: TABLET at 09:34

## 2022-06-03 RX ADMIN — Medication 10 ML: at 09:34

## 2022-06-03 RX ADMIN — Medication 1000 UNITS: at 09:34

## 2022-06-03 RX ADMIN — ACETAMINOPHEN 325MG 650 MG: 325 TABLET ORAL at 00:49

## 2022-06-03 RX ADMIN — ENOXAPARIN SODIUM 30 MG: 30 INJECTION SUBCUTANEOUS at 09:34

## 2022-06-03 NOTE — NURSING NOTE
Hypertensive at times, other VSS, 1 large soft dark brown BM this morning, voiding, leg pain controlled w/ tylenol. Pt alert to self and situation, though more confused this AM. Up to BSC with assist x1, gait belt, and walker. Report called to MARIA DEL CARMEN Velazquez at [facility] rehab. PO abx started today. Pt is aware of plan and has informed her family. Awaiting transportation via wheelchair van.

## 2022-06-03 NOTE — PLAN OF CARE
Goal Outcome Evaluation:              Outcome Evaluation: Improved  independence w/ mobility during PT today.  Able to ambulate short distance in room today w/ min A using RW, slow shfufling steps w/ cues for posture.  Performed few sitting and standing exercises.  Recommend DC to SNU.

## 2022-06-03 NOTE — PLAN OF CARE
Goal Outcome Evaluation:  Plan of Care Reviewed With: patient           Outcome Evaluation: VSS, confused (disoriented to time and situation) urine samples sent, c/o pain to legs- given tylenol, up with asst to BSC, tolerating diet, likely DC 6/3/22 to [facility]

## 2022-06-03 NOTE — PROGRESS NOTES
Continued Stay Note  [facility]     Patient Name: Liudmila Aguayo  MRN: [id number]  Today's Date: 6/3/2022    Admit Date: 5/29/2022     Discharge Plan     Row Name 06/03/22 1232       Plan    Plan [facility] skilled rehab    Plan Comments DC order noted. Transportation scheduled with [employer] [facility] van for 3pm. Confirmation # [id number]. Patient in agreemen to plan. Patient called and informed spouse of DC today to [facility]. DC summary faxed. Packet given to RN. Informed [place] with [facility] of time of transportation.               Discharge Codes    No documentation.               Expected Discharge Date and Time     Expected Discharge Date Expected Discharge Time    Jun 4, 2022             Grecia Estrada RN

## 2022-06-03 NOTE — THERAPY TREATMENT NOTE
Patient Name: Liudmila Aguayo  : 1928    MRN: [id number]                              Today's Date: 6/3/2022       Admit Date: 2022    Visit Dx:     ICD-10-CM ICD-9-CM   1. General weakness  R53.1 780.79   2. Fatigue, unspecified type  R53.83 780.79   3. Kidney function test abnormal  R94.4 794.4     Patient Active Problem List   Diagnosis   • Osteoporosis   • Colitis   • General weakness   • Severe malnutrition (HCC)     Past Medical History:   Diagnosis Date   • Anemia    • Colitis    • History of irregular heartbeat    • Hypertension    • Lower GI bleed    • Macular degeneration    • Osteoporosis    • Osteoporosis    • PNA (pneumonia)     25 years ago   • Urinary tract infection      Past Surgical History:   Procedure Laterality Date   • APPENDECTOMY     • SKIN CANCER EXCISION        General Information     Row Name 22 1156          Physical Therapy Time and Intention    Document Type therapy note (daily note)  -AR     Mode of Treatment physical therapy  -AR     Row Name 22 1156          General Information    Patient Profile Reviewed yes  -AR     Existing Precautions/Restrictions fall  -AR     Row Name 22 1156          Cognition    Orientation Status (Cognition) oriented x 3  -AR     Row Name 22 1156          Safety Issues, Functional Mobility    Impairments Affecting Function (Mobility) balance;endurance/activity tolerance;pain  -AR           User Key  (r) = Recorded By, (t) = Taken By, (c) = Cosigned By    Initials Name Provider Type    AR Delmis Mccray PT Physical Therapist               Mobility     Row Name 22 1156          Bed Mobility    Bed Mobility supine-sit;sit-supine  -AR     Supine-Sit [place] (Bed Mobility) minimum assist (75% patient effort)  -AR     Sit-Supine [place] (Bed Mobility) minimum assist (75% patient effort)  -AR     Assistive Device (Bed Mobility) bed rails;head of bed elevated  -AR     Row Name 22 1156          Sit-Stand  Transfer    Sit-Stand [employer] (Transfers) minimum assist (75% patient effort)  -AR     Assistive Device (Sit-Stand Transfers) walker, front-wheeled  -AR     Row Name 06/03/22 1156          Gait/Stairs (Locomotion)    [employer] Level (Gait) minimum assist (75% patient effort)  -AR     Assistive Device (Gait) walker, front-wheeled  -AR     Distance in Feet (Gait) side steps towards HOB and 5' w/ min A using RW, slow shuffling steps, forward flexed posture  -AR     Deviations/Abnormal Patterns (Gait) gait speed decreased;festinating/shuffling  -AR     Bilateral Gait Deviations heel strike decreased;forward flexed posture  -AR           User Key  (r) = Recorded By, (t) = Taken By, (c) = Cosigned By    Initials Name Provider Type    Delmis Foreman, PT Physical Therapist               Obj/Interventions     Row Name 06/03/22 1157          Motor Skills    Therapeutic Exercise --  B AP, LAQ, standing marches 10x  -AR     Row Name 06/03/22 1157          Balance    Dynamic Standing Balance minimal assist  -AR     Position/Device Used, Standing Balance walker, rolling  -AR           User Key  (r) = Recorded By, (t) = Taken By, (c) = Cosigned By    Initials Name Provider Type    Delmis Foreman, PT Physical Therapist               Goals/Plan    No documentation.                Clinical Impression     Row Name 06/03/22 1157          Pain    Pretreatment Pain Rating 4/10  -AR     Posttreatment Pain Rating 5/10  -AR     Pain Location - Side/Orientation Right  -AR     Pain Location lower  -AR     Pain Location - extremity  -AR     Pain Intervention(s) Medication (See MAR);Repositioned  -AR     Row Name 06/03/22 1157          Plan of Care Review    Outcome Evaluation Improved  independence w/ mobility during PT today.  Able to ambulate short distance in room today w/ min A using RW, slow shfufling steps w/ cues for posture.  Performed few sitting and standing exercises.  Recommend DC to SNU.  -AR     Row Name  06/03/22 1157          Therapy Assessment/Plan (PT)    Therapy Frequency (PT) 5 times/wk  -AR     Row Name 06/03/22 1157          Vital Signs    O2 Delivery Pre Treatment room air  -AR     Row Name 06/03/22 1157          Positioning and Restraints    Pre-Treatment Position in bed  -AR     Post Treatment Position bed  -AR     In Bed supine;call light within reach;encouraged to call for assist;exit alarm on  -AR           User Key  (r) = Recorded By, (t) = Taken By, (c) = Cosigned By    Initials Name Provider Type    Delmis Foreman, PT Physical Therapist               Outcome Measures     Row Name 06/03/22 1158 06/03/22 0800       How much help from another person do you currently need...    Turning from your back to your side while in flat bed without using bedrails? 4  -AR 3  -JS    Moving from lying on back to sitting on the side of a flat bed without bedrails? 3  -AR 3  -JS    Moving to and from a bed to a chair (including a wheelchair)? 3  -AR 2  -JS    Standing up from a chair using your arms (e.g., wheelchair, bedside chair)? 3  -AR 2  -JS    Climbing 3-5 steps with a railing? 1  -AR 2  -JS    To walk in hospital room? 2  -AR 2  -JS    AM-PAC 6 Clicks Score (PT) 16  -AR 14  -JS    Highest level of mobility 5 --> Static standing  -AR 4 --> Transferred to chair/commode  -JS    Row Name 06/03/22 1158          Functional Assessment    Outcome Measure Options AM-PAC 6 Clicks Basic Mobility (PT)  -AR           User Key  (r) = Recorded By, (t) = Taken By, (c) = Cosigned By    Initials Name Provider Type    Delmis Foreman PT Physical Therapist    Dolly Gallo, RN Registered Nurse                             Physical Therapy Education                 Title: PT OT SLP Therapies (In Progress)     Topic: Physical Therapy (In Progress)     Point: Mobility training (In Progress)     Learning Progress Summary           Patient Acceptance, E, NR by AR at 6/3/2022 1159    Acceptance, E,D, DU,NR by PC at  6/1/2022 1457    Acceptance, E,TB, VU by  at 5/31/2022 1318                   Point: Home exercise program (In Progress)     Learning Progress Summary           Patient Acceptance, E, NR by AR at 6/3/2022 1159    Acceptance, E,D, DU,NR by PC at 6/1/2022 1457                   Point: Body mechanics (In Progress)     Learning Progress Summary           Patient Acceptance, E, NR by AR at 6/3/2022 1159    Acceptance, E,D, DU,NR by PC at 6/1/2022 1457    Acceptance, E,TB, VU by  at 5/31/2022 1318                   Point: Precautions (In Progress)     Learning Progress Summary           Patient Acceptance, E, NR by AR at 6/3/2022 1159    Acceptance, E,D, DU,NR by PC at 6/1/2022 1457    Acceptance, E,TB, VU by  at 5/31/2022 1318                               User Key     Initials Effective Dates Name Provider Type Discipline    PC 06/16/21 -  Elvira Mcneil PT Physical Therapist PT    AR 06/16/21 -  Delmis Mccray, PT Physical Therapist PT     05/02/22 -  Yoana Sommer, PT Physical Therapist PT              PT Recommendation and Plan     Outcome Evaluation: Improved  independence w/ mobility during PT today.  Able to ambulate short distance in room today w/ min A using RW, slow shfufling steps w/ cues for posture.  Performed few sitting and standing exercises.  Recommend DC to SNU.     Time Calculation:    PT Charges     Row Name 06/03/22 1156             Time Calculation    Start Time 1001  -AR      Stop Time 1018  -AR      Time Calculation (min) 17 min  -AR      PT Received On 06/03/22  -AR      PT - Next Appointment 06/06/22  -AR            User Key  (r) = Recorded By, (t) = Taken By, (c) = Cosigned By    Initials Name Provider Type    AR Delmis Mccray, PT Physical Therapist              Therapy Charges for Today     Code Description Service Date Service Provider Modifiers Qty    61764064752 HC PT THER PROC EA 15 MIN 6/3/2022 Delmis Mccray, PT GP 1          PT G-Codes  Outcome Measure Options:  AM-PAC 6 Clicks Basic Mobility (PT)  AM-PAC 6 Clicks Score (PT): 16  AM-PAC 6 Clicks Score (OT): 15  Modified [place] Scale: 4 - Moderately severe disability.  Unable to walk without assistance, and unable to attend to own bodily needs without assistance.    Delmis Mccray, PT  6/3/2022

## 2022-06-03 NOTE — DISCHARGE SUMMARY
"Date of admission: May 29, 2020  Date of discharge: Vickie 3, 2022    Discharge diagnosis:  1.  Asthenia and weight loss  2.  Autonomic neuropathy  3.  Pericardial effusion  4.  Urinary tract infection  5.  Essential hypertension  6.  Macular degeneration  7.  Osteoarthritis    Procedures performed during this admission: None    Consults during this admission: None    History of present illness:  This is a very nice 89-year-old lady.  She has a history of osteoarthritis, osteoporosis, hypertension and macular degeneration.  She had distant polymyalgia rheumatica rheumatica.  Patient presented to the emergency room after having a couple month history of progressive weakness and dizziness.  She had to the point where she could not stand and walk due to weakness and imbalance.  She had a couple falls but never struck her head and was never knocked unconscious.  Patient also complained of poor appetite and had lost about 10 pounds over the past year.    Physical exam at the time of discharge:  /77 (BP Location: Left arm, Patient Position: Lying)   Pulse 61   Temp 97.6 °F (36.4 °C) (Oral)   Resp 18   Ht 162.6 cm (64\")   Wt 47.6 kg (105 lb)   SpO2 97%   BMI 18.02 kg/m²   Appearance: Cachectic elderly lady who is lying in bed in no distress.  Lungs: Few rhonchi in the bases but otherwise clear  Heart: Regular rate and rhythm.  Abdomen: Benign.  Normal active bowel sounds.  Extremities: Without edema clubbing or cyanosis.  Patient had normal range of motion of the hips and the knees and no pain with motion.  No gross abnormality or tenderness in the anterior thighs.  Neurologic: Alert and oriented x3.    The patient could lift her left leg off the bed but could not do so with the right.  She had good dorsiflexion and plantarflexion strength of her feet.  She had 2+ biceps and patellar reflexes bilaterally.  Absent ankle reflexes.    Significant laboratory studies:  Comprehensive metabolic panel was " unremarkable.  Magnesium at time of discharge was 2.9  Serum protein electrophoresis demonstrated no monoclonal gammopathy.  Protein in your pheresis was unremarkable.  Kappa and lambda light chains had a normal ratio.  Vitamin D 45  Phosphorus 3.4  White blood cell count 9.7, hemoglobin 11.1, hematocrit 32.9, MCV 91, platelet count 238  COVID PCR negative.  ESR 23  CPK32  Troponin 0.016  PTH 9.1  TSH 1.0  Free T4 1.39  CRP 2  Urinalysis on admission was unremarkable.  Urinalysis today before discharge demonstrated pyuria, some white cells and bacteria.  Cultures pending    Chest x-ray PA and lateral demonstrated some chronic interstitial scarring in both lungs and cardiomegaly.    EKG sinus rhythm with LVH and nonspecific ST and T wave change    Echocardiogram:Calculated left ventricular EF = 65% Estimated left ventricular EF was in agreement with the calculated left ventricular EF.Left ventricular wall thickness is consistent with moderate concentric hypertrophy.  Left ventricular diastolic function was indeterminate.  There is a moderate (1-2cm) pericardial effusion adjacent to the right atrium, right ventricle and left ventricle.The effusion is fluid filled. There is no evidence of cardiac tamponade.    CT scan chest/abdomen/pelvis:1. Moderate-sized pericardial effusion.  2. Trace pleural effusions.  3. Patchy areas of chronic parenchymal change, pneumonia and/or  atelectasis in the bilateral lungs.  4. Multiple hepatic cysts.  5. Mild wall thickening of the colon is consistent with mild colitis. No  free air or abscess is seen. Also, there is no evidence of bowel  obstruction.    X-ray of the right femur and pelvis:Chronic and degenerative changes. No acute fracture is identified in the  lumbar spine, pelvis, right hip. Follow up/further evaluation can be  obtained as indications persist.    Hospital course:   Patient was admitted with severe weakness and weight loss.  She had some minor electrolyte  abnormalities including hypokalemia and hypomagnesemia.  These were corrected.  We did cross-sectional imaging of the chest abdomen and pelvis to look for malignancy.  The studies were essentially negative.  The results are listed above.  Patient had cardiomegaly on admission.  Echocardiogram was performed which demonstrated a fairly large pericardial effusion.  I spoke to cardiology concerning these findings.  I am concerned about the possibility of amyloidosis in this patient.  We did a screen for paraproteinemia.  This was not present.  I plan to get a PYP scan to look for transthyretin amyloidosis as an outpatient.  While in the hospital patient was seen by physical therapy and Occupational Therapy and had functional gains.  The time of discharge she was able to ambulate to the bathroom with assistance.    The patient is being transferred to [facility].  Here she will undergo a more rehabilitation.    The patient will eat a regular diet.  Patient will undergo physical therapy and Occupational Therapy    Medications at the time of discharge:  Losartan 100 mg daily  Metoprolol 100 mg a day  Calcium plus D twice daily  Vitamin D 1000 IUs daily  Bactrim DS 1 p.o. twice daily for a total of 6 days.    Patient will be followed by the doctors at [facility].  I would like to see her back in my office in about 3 weeks.    This patient is a no code.

## 2022-06-05 LAB
BACTERIA SPEC AEROBE CULT: ABNORMAL
CALPROTECTIN STL-MCNT: 168 UG/G (ref 0–120)

## 2022-06-06 LAB — INTERPRETATION UR IFE-IMP: NORMAL

## 2022-06-06 NOTE — PROGRESS NOTES
Case Management Discharge Note      Final Note: Pt DC to skilled level of care at [facility]    Provided Post Acute Provider List?: N/A  N/A Provider List Comment: Requested [facility]    Selected Continued Care - Discharged on 6/3/2022 Admission date: 5/29/2022 - Discharge disposition: Skilled Nursing Facility (DC - External)    Destination Coordination complete.    Service Provider Selected Services Address Phone Fax Patient Preferred    [facility]  Skilled Nursing [address] [id number] [phone] --          Durable Medical Equipment    No services have been selected for the patient.              Dialysis/Infusion    No services have been selected for the patient.              Home Medical Care    No services have been selected for the patient.              Therapy    No services have been selected for the patient.              Community Resources    No services have been selected for the patient.              Community & DME    No services have been selected for the patient.                       Final Discharge Disposition Code: 03 - skilled nursing facility (SNF)

## 2022-06-15 ENCOUNTER — HOME HEALTH ADMISSION (OUTPATIENT)
Dept: HOME HEALTH SERVICES | Facility: HOME HEALTHCARE | Age: 87
End: 2022-06-15

## 2022-07-01 ENCOUNTER — TRANSCRIBE ORDERS (OUTPATIENT)
Dept: ADMINISTRATIVE | Facility: HOSPITAL | Age: 87
End: 2022-07-01

## 2022-07-01 DIAGNOSIS — I31.39 PERICARDIAL EFFUSION: Primary | ICD-10-CM

## 2022-07-08 ENCOUNTER — HOSPITAL ENCOUNTER (OUTPATIENT)
Dept: NUCLEAR MEDICINE | Facility: HOSPITAL | Age: 87
Discharge: HOME | End: 2022-07-08

## 2022-07-08 DIAGNOSIS — I31.39 PERICARDIAL EFFUSION: ICD-10-CM

## 2022-07-08 PROCEDURE — 0 TECHNETIUM MEDRONATE KIT: Performed by: INTERNAL MEDICINE

## 2022-07-08 PROCEDURE — A9503 TC99M MEDRONATE: HCPCS | Performed by: INTERNAL MEDICINE

## 2022-07-08 PROCEDURE — 78306 BONE IMAGING WHOLE BODY: CPT

## 2022-07-08 RX ORDER — TC 99M MEDRONATE 20 MG/10ML
22.9 INJECTION, POWDER, LYOPHILIZED, FOR SOLUTION INTRAVENOUS
Status: COMPLETED | OUTPATIENT
Start: 2022-07-08 | End: 2022-07-08

## 2022-07-08 RX ADMIN — Medication 22.9 MILLICURIE: at 10:35

## 2022-08-04 ENCOUNTER — LAB (OUTPATIENT)
Dept: OTHER | Facility: HOSPITAL | Age: 87
End: 2022-08-04

## 2022-08-04 ENCOUNTER — INFUSION (OUTPATIENT)
Dept: ONCOLOGY | Facility: HOSPITAL | Age: 87
End: 2022-08-04

## 2022-08-04 VITALS — TEMPERATURE: 98 F | RESPIRATION RATE: 18 BRPM | BODY MASS INDEX: 18.02 KG/M2 | HEIGHT: 64 IN

## 2022-08-04 DIAGNOSIS — M81.0 OSTEOPOROSIS, UNSPECIFIED OSTEOPOROSIS TYPE, UNSPECIFIED PATHOLOGICAL FRACTURE PRESENCE: ICD-10-CM

## 2022-08-04 DIAGNOSIS — M81.0 OSTEOPOROSIS, UNSPECIFIED OSTEOPOROSIS TYPE, UNSPECIFIED PATHOLOGICAL FRACTURE PRESENCE: Primary | ICD-10-CM

## 2022-08-04 LAB
25(OH)D3 SERPL-MCNC: 56.1 NG/ML (ref 30–100)
ALBUMIN SERPL-MCNC: 4.1 G/DL (ref 3.5–5.2)
ALBUMIN/GLOB SERPL: 1.4 G/DL
ALP SERPL-CCNC: 70 U/L (ref 39–117)
ALT SERPL W P-5'-P-CCNC: 19 U/L (ref 1–33)
ANION GAP SERPL CALCULATED.3IONS-SCNC: 10.3 MMOL/L (ref 5–15)
AST SERPL-CCNC: 20 U/L (ref 1–32)
BILIRUB SERPL-MCNC: 0.4 MG/DL (ref 0–1.2)
BUN SERPL-MCNC: 40 MG/DL (ref 8–23)
BUN/CREAT SERPL: 43 (ref 7–25)
CALCIUM SPEC-SCNC: 9.5 MG/DL (ref 8.2–9.6)
CHLORIDE SERPL-SCNC: 103 MMOL/L (ref 98–107)
CO2 SERPL-SCNC: 24.7 MMOL/L (ref 22–29)
CREAT SERPL-MCNC: 0.93 MG/DL (ref 0.57–1)
EGFRCR SERPLBLD CKD-EPI 2021: 57.1 ML/MIN/1.73
GLOBULIN UR ELPH-MCNC: 3 GM/DL
GLUCOSE SERPL-MCNC: 114 MG/DL (ref 65–99)
POTASSIUM SERPL-SCNC: 4.5 MMOL/L (ref 3.5–5.2)
PROT SERPL-MCNC: 7.1 G/DL (ref 6–8.5)
SODIUM SERPL-SCNC: 138 MMOL/L (ref 136–145)

## 2022-08-04 PROCEDURE — 96372 THER/PROPH/DIAG INJ SC/IM: CPT

## 2022-08-04 PROCEDURE — 82306 VITAMIN D 25 HYDROXY: CPT | Performed by: OBSTETRICS & GYNECOLOGY

## 2022-08-04 PROCEDURE — 25010000002 DENOSUMAB 60 MG/ML SOLUTION PREFILLED SYRINGE: Performed by: OBSTETRICS & GYNECOLOGY

## 2022-08-04 PROCEDURE — 36415 COLL VENOUS BLD VENIPUNCTURE: CPT

## 2022-08-04 PROCEDURE — 80053 COMPREHEN METABOLIC PANEL: CPT | Performed by: OBSTETRICS & GYNECOLOGY

## 2022-08-04 RX ADMIN — DENOSUMAB 60 MG: 60 INJECTION SUBCUTANEOUS at 15:38

## 2022-08-04 NOTE — NURSING NOTE
"Arrived  for prolia injection. Indication and side effects reviewed. Denies recent dental work. Labs and medications verified. Prolia administered in left arm without incidence. Instructed to call prescribing MD for any concerns or questions and instructed on how to schedule future appts.  Pt vu and discharged in stable condition per w/c and accompanied to car per this nurse. States that she will have subsequent injections at PCP office as \"we get lost every time we come here and go home\". Informed both pt. and spouse that the Prolia could not be given until 02-.      "

## 2022-08-24 ENCOUNTER — APPOINTMENT (OUTPATIENT)
Dept: CT IMAGING | Facility: HOSPITAL | Age: 87
End: 2022-08-24

## 2022-08-24 ENCOUNTER — HOSPITAL ENCOUNTER (EMERGENCY)
Facility: HOSPITAL | Age: 87
Discharge: HOME | End: 2022-08-24
Attending: EMERGENCY MEDICINE | Admitting: EMERGENCY MEDICINE

## 2022-08-24 VITALS
HEART RATE: 70 BPM | DIASTOLIC BLOOD PRESSURE: 77 MMHG | TEMPERATURE: 97.8 F | SYSTOLIC BLOOD PRESSURE: 155 MMHG | RESPIRATION RATE: 16 BRPM | OXYGEN SATURATION: 98 %

## 2022-08-24 DIAGNOSIS — S51.019A SKIN TEAR OF ELBOW WITHOUT COMPLICATION, INITIAL ENCOUNTER: Primary | ICD-10-CM

## 2022-08-24 DIAGNOSIS — S00.83XA FOREHEAD CONTUSION, INITIAL ENCOUNTER: ICD-10-CM

## 2022-08-24 DIAGNOSIS — S80.212A ABRASION OF LEFT KNEE, INITIAL ENCOUNTER: ICD-10-CM

## 2022-08-24 PROCEDURE — 72125 CT NECK SPINE W/O DYE: CPT

## 2022-08-24 PROCEDURE — 99282 EMERGENCY DEPT VISIT SF MDM: CPT

## 2022-08-24 PROCEDURE — 70450 CT HEAD/BRAIN W/O DYE: CPT

## 2022-08-24 NOTE — ED TRIAGE NOTES
L elbow, knee abrasion and forehead lac s/p fall last night at 2030.  -loc Pt wearing mask on arrival.

## 2022-08-24 NOTE — DISCHARGE INSTRUCTIONS
Keep wounds clean and dry, follow-up with your primary care provider next week for recheck, continue current medications, ED return for worsening symptoms as needed.  Ice for any pain or swelling as needed.

## 2022-08-24 NOTE — ED PROVIDER NOTES
EMERGENCY DEPARTMENT ENCOUNTER    Room Number:  HB1/C  Date of encounter:  8/24/2022  PCP: Harley Barney MD  Historian: Patient,       HPI:  Chief Complaint: Fall, abrasions  A complete HPI/ROS/PMH/PSH/SH/FH are unobtainable due to: None    Context: Liudmila Aguayo is a 89 y.o. female who presents to the ED via private vehicle for evaluation of abrasions to her left elbow, left wrist, and left knee after she sustained a fall last night around 8:30 PM at home.  Loss of balance rule out in the driveway.  Did strike her head.  Was referred over here by her PCP today for wound care.  Otherwise reports feeling normal.  No other injuries or complaints at this time.      MEDICAL RECORD REVIEW    Not on any anticoagulation on chart review in [facility]    PAST MEDICAL HISTORY  Active Ambulatory Problems     Diagnosis Date Noted   • Osteoporosis 05/03/2016   • Colitis 06/10/2017   • General weakness 05/29/2022   • Severe malnutrition (HCC) 05/31/2022     Resolved Ambulatory Problems     Diagnosis Date Noted   • No Resolved Ambulatory Problems     Past Medical History:   Diagnosis Date   • Anemia    • History of irregular heartbeat    • Hypertension    • Lower GI bleed    • Macular degeneration    • PNA (pneumonia)    • Urinary tract infection          PAST SURGICAL HISTORY  Past Surgical History:   Procedure Laterality Date   • APPENDECTOMY     • SKIN CANCER EXCISION           FAMILY HISTORY  No family history on file.      SOCIAL HISTORY  Social History     Socioeconomic History   • Marital status:    Tobacco Use   • Smoking status: Former Smoker   • Smokeless tobacco: Never Used   • Tobacco comment: quit 25 years ago   Vaping Use   • Vaping Use: Never used   Substance and Sexual Activity   • Alcohol use: Yes     Alcohol/week: 7.0 standard drinks     Types: 7 Glasses of wine per week     Comment: occassionally  last taken 2 weeks ago   • Drug use: No   • Sexual activity: Defer         ALLERGIES  Patient has no  known allergies.        REVIEW OF SYSTEMS  Review of Systems     All systems reviewed and negative except for those discussed in HPI.       PHYSICAL EXAM    I have reviewed the triage vital signs and nursing notes.    ED Triage Vitals   Temp Heart Rate Resp BP SpO2   08/24/22 1235 08/24/22 1234 08/24/22 1234 08/24/22 1235 08/24/22 1234   97.8 °F (36.6 °C) 70 16 155/77 98 %      Temp src Heart Rate Source Patient Position BP Location FiO2 (%)   08/24/22 1235 08/24/22 1509 08/24/22 1235 08/24/22 1235 --   Oral Monitor Sitting Right arm        Physical Exam  General: No acute distress, nontoxic  HEENT: Mucous membranes moist, left anterior forehead hematoma without laceration, EOMI  Neck: Full ROM  Pulm: Symmetric chest rise, nonlabored, lungs CTAB  Cardiovascular: Regular rate and rhythm, intact distal pulses  GI: Soft, nontender, nondistended, no rebound, no guarding, bowel sounds present  MSK: Full ROM, no deformity  Skin: Warm, dry, skin tear over the dorsal left elbow as well as the anterior tibial tuberosity of the left knee and a superficial abrasion at the left wrist  Neuro: Awake, alert, oriented x 4, GCS 15, moving all extremities, no focal deficits  Psych: Calm, cooperative      N95, protective eye goggles, and gloves used during this encounter. Patient in surgical mask.      LAB RESULTS  No results found for this or any previous visit (from the past 24 hour(s)).      RADIOLOGY  CT Head Without Contrast, CT Cervical Spine Without Contrast    Result Date: 8/24/2022  EMERGENCY NONCONTRAST HEAD CT AND NONCONTRAST CERVICAL SPINE CT 08/24/2022  CLINICAL HISTORY: Patient fell, left forehead contusion, has headache and neck pain.  HEAD CT TECHNIQUE: Spiral CT images were obtained from the base of the skull to the vertex without intravenous contrast. Images were reformatted and are submitted in 3 mm thick axial, sagittal and coronal CT sections with brain algorithm and 2 mm thick axial CT sections with  high-resolution bone algorithm.  FINDINGS: There is some mild low-density in the periventricular white matter consistent with mild small vessel disease. There is a focal area of encephalomalacia in the anterior inferior left frontal lobe measuring 13 x 10 x 10 mm likely an old posttraumatic area of encephalomalacia. The remainder of the brain parenchyma is normal in attenuation. The ventricles are normal in size. There is no midline shift. There is prominence of extra-axial subdural space overlying the anterior lateral right frontal lobe that measures 10 mm in thickness from inner table skull to the anterior inferior lateral right frontal cortex and lesser degree of prominence in the subdural space overlying the anterior inferior lateral left frontal lobe measuring up to 6 mm in thickness. 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 I see no evidence of acute intracranial hemorrhage. No acute skull fracture is identified. The calvarium and the skull base are normal in appearance. The paranasal sinuses, the mastoid air cells and middle ear cavities are clear. There is a tiny dural-based calcific density overlying the posterior inferior lateral right frontal lobe measuring 4 x 3 x 4 mm in size. It is unchanged over multiple prior exams and felt to be benign dystrophic dural calcification. There is a small anterior lateral left frontal scalp hematoma from today's head trauma.      1. No acute intracranial abnormality is seen. 2. There is mild small vessel disease in the cerebral white matter. There is a focal area of encephalomalacia in the anterior-inferior left frontal lobe measuring 13 x 10 x 10 mm in size compatible with an old posttraumatic area of encephalomalacia. There are chronic CSF density subdural hygromas overlying the anterior inferior lateral frontal lobes bilaterally right greater than  left on the right measuring 10 mm and on the left 6 mm in thickness from inner table of skull to the anterior inferior lateral frontal cortices. 3. There is a small scalp hematoma over the anterior lateral left frontal bone from today's head trauma. The remainder of the head CT is within normal limits with no acute skull fracture or intracranial hemorrhage identified.   CERVICAL SPINE CT TECHNIQUE: Spiral CT images were obtained from the skull base down to the T4 thoracic level and images were reformatted and submitted in 2 mm thick axial and sagittal CT sections with soft tissue algorithm and 1 mm thick axial, sagittal and coronal CT sections with high-resolution bone algorithm.  COMPARISON: This is correlated to a cervical spine CT 05/01/2014. This is also correlated to prior chest CT from [facility] 05/31/2022.  FINDINGS: The atlantooccipital articulation is within normal limits. There is a fracture of the anterior midline of the anterior ring of C1 with a 4 mm fracture gap. 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 Otherwise the C1 ring is intact.  At C2-3 there is mild bilateral facet overgrowth and a 2 mm retrolisthesis of C2 on C3, mild posterior spurring with some thickening calcification interspinous ligament indenting the posterior midline of the thecal sac, and there is mild narrowing of the anterior posterior diameter of the canal.  At C3-4 there is moderate bilateral facet overgrowth. There is disc space narrowing, degenerative endplate changes and posterior endplate spurring. There is mild canal narrowing. There is some uncovertebral joint hypertrophy and there is mild-to-moderate right and there is moderate left bony foraminal narrowing.  At C4-5 there is moderate left and severe right facet overgrowth. There is a rotational 1-2 mm anterolisthesis of the right side of C4 with respect to C5.  There is mild posterior endplate spurring. There is mild canal narrowing. There is uncovertebral joint hypertrophy. There is mild left and there is severe right bony foraminal narrowing.  At C5-6 there is mild-to-moderate bilateral facet overgrowth. There is disc space narrowing, degenerative endplate changes, 1-2 mm retrolisthesis of C5 on C6, mild posterior spurring, mild canal narrowing and mild uncovertebral joint hypertrophy. There is mild left and there is mild-to-moderate right bony foraminal narrowing.  At C6-7 there is moderate bilateral facet overgrowth and 1-2 mm anterolisthesis of C6 on C7. There is no canal narrowing and there is essentially no foraminal narrowing.  At C7-T1 there is mild-to-moderate left and moderate right facet overgrowth and 1 mm anterolisthesis of C7 on T1. No canal or foraminal narrowing.  No acute fracture is seen in the cervical spine.  There is moderate compression fracture involving the inferior body and endplate of T3 with about 50% loss of anterior and 10% loss of posterior vertebral body height, and there is a moderate compression fracture involving the inferior body and endplate of T4 where there is about 40% loss of anterior and central and 10% loss of posterior vertebral body height. These are similar to prior chest CT on 05/31/2022. There are old healed fractures of the spinous processes of T2 and T3.  IMPRESSION: 1. No acute fracture is seen in the cervical spine. 2. 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 3. There are chronic fractures involving the inferior body and endplate of the T3 and T4 thoracic vertebrae with about 50% loss of anterior and 10% loss of posterior vertebral body height at T3, 40% loss of anterior and central and 10% loss of posterior vertebral body height at T4, and there are old  healed fractures of the spinous processes of T2 and T3 unchanged since chest CT 05/31/2022. There is diffuse cervical spondylosis as described.  The results were communicated Tj Wakefield MD from the ER by telephone on 08/24/2022 at 3 PM.   Radiation dose reduction techniques were utilized, including automated exposure control and exposure modulation based on body size.  This report was finalized on 8/24/2022 3:51 PM by Dr. Luis Angel Souza M.D.        I ordered the above noted radiological studies. Reviewed by me.  See dictation for official radiology interpretation.      PROCEDURES    Procedures      MEDICATIONS GIVEN IN ER    Medications - No data to display      PROGRESS, DATA ANALYSIS, CONSULTS, AND MEDICAL DECISION MAKING    All labs have been independently reviewed by me.  All radiology studies have been reviewed by me and discussed with radiologist dictating the report.   EKG's independently viewed and interpreted by me.  Discussion below represents my analysis of pertinent findings related to patient's condition, differential diagnosis, treatment plan and final disposition.    Initial concern for skull fracture, intracranial hemorrhage, cervical fracture, among others.  Wounds are mostly skin tears and nearly 24 hours out from initial injury, will not move forward with laceration repair at this time but will improve coverage of the exposed skin and fresh dressings.  Outpatient follow-up with PCP.    ED Course as of 08/24/22 2216   Wed Aug 24, 2022   1525 Discussed CT scans with the radiologist, no evidence of any acute emergent findings. [DC]   1538 CT Head Without Contrast  reviewed [DC]   1538 CT Cervical Spine Without Contrast  reviewed [DC]      ED Course User Index  [DC] Juan Diego Wakefield MD     No acute emergent findings today, patient is well-appearing in no acute distress.  Safe for discharge with close outpatient PCP follow-up.  Wound care as discussed.  ED return for worsening symptoms as needed.    AS OF  22:16 EDT VITALS:    BP - 155/77  HR - 70  TEMP - 97.8 °F (36.6 °C) (Oral)  02 SATS - 98%        DIAGNOSIS  Final diagnoses:   Skin tear of elbow without complication, initial encounter   Abrasion of left knee, initial encounter   Forehead contusion, initial encounter         DISPOSITION  DISCHARGE    Patient discharged in stable condition.    Reviewed implications of results, diagnosis, meds, responsibility to follow up, warning signs and symptoms of possible worsening, potential complications and reasons to return to ER.    Patient/Family voiced understanding of above instructions.    Discussed plan for discharge, as there is no emergent indication for admission. Patient referred to primary care provider for BP management due to today's BP. Pt/family is agreeable and understands need for follow up and repeat testing.  Pt is aware that discharge does not mean that nothing is wrong but it indicates no emergency is present that requires admission and they must continue care with follow-up as given below or physician of their choice.     FOLLOW-UP  [facility] Emergency Department  [address] [facility] [address] [phone]    As needed, If symptoms worsen    Harley Barney MD  [address] [id number] [phone]    Schedule an appointment as soon as possible for a visit            Medication List      No changes were made to your prescriptions during this visit.                    Juan Diego Wakefield MD  08/24/22 9068